# Patient Record
Sex: FEMALE | Race: ASIAN | NOT HISPANIC OR LATINO | Employment: UNEMPLOYED | URBAN - METROPOLITAN AREA
[De-identification: names, ages, dates, MRNs, and addresses within clinical notes are randomized per-mention and may not be internally consistent; named-entity substitution may affect disease eponyms.]

---

## 2023-09-29 ENCOUNTER — LAB (OUTPATIENT)
Dept: LAB | Facility: LAB | Age: 33
End: 2023-09-29
Payer: COMMERCIAL

## 2023-09-29 LAB
ABO GROUP (TYPE) IN BLOOD: NORMAL
ANTIBODY SCREEN: NORMAL
ERYTHROCYTE DISTRIBUTION WIDTH (RATIO) BY AUTOMATED COUNT: 12.6 % (ref 11.5–14.5)
ERYTHROCYTE MEAN CORPUSCULAR HEMOGLOBIN CONCENTRATION (G/DL) BY AUTOMATED: 33.4 G/DL (ref 32–36)
ERYTHROCYTE MEAN CORPUSCULAR VOLUME (FL) BY AUTOMATED COUNT: 96 FL (ref 80–100)
ERYTHROCYTES (10*6/UL) IN BLOOD BY AUTOMATED COUNT: 3.28 X10E12/L (ref 4–5.2)
FERRITIN, PREGNANCY: 90 UG/L
FOLATE, SERUM, PREGNANCY: 6.9 NG/ML
HEMATOCRIT (%) IN BLOOD BY AUTOMATED COUNT: 31.4 % (ref 36–46)
HEMOGLOBIN (G/DL) IN BLOOD: 10.5 G/DL (ref 12–16)
HEPATITIS B VIRUS SURFACE AG PRESENCE IN SERUM: NONREACTIVE
HEPATITIS C VIRUS AB PRESENCE IN SERUM: NONREACTIVE
HIV 1/ 2 AG/AB SCREEN: NONREACTIVE
IRON (UG/DL) IN SER/PLAS IN PREGNANCY: 40 UG/DL
IRON BINDING CAPACITY (UG/DL) IN PREGNANCY: 474 UG/DL
IRON SATURATION (%) IN PREGNANCY: 8 %
LEUKOCYTES (10*3/UL) IN BLOOD BY AUTOMATED COUNT: 8.8 X10E9/L (ref 4.4–11.3)
NRBC (PER 100 WBCS) BY AUTOMATED COUNT: 0 /100 WBC (ref 0–0)
PLATELETS (10*3/UL) IN BLOOD AUTOMATED COUNT: 227 X10E9/L (ref 150–450)
REFLEX ADDED, ANEMIA PANEL: ABNORMAL
RH FACTOR: NORMAL
RUBELLA VIRUS IGG AB: POSITIVE
SYPHILIS TOTAL AB: NONREACTIVE
VITAMIN B12, PREGNANCY: 290 PG/ML

## 2023-09-29 ASSESSMENT — EDINBURGH POSTNATAL DEPRESSION SCALE (EPDS)
I HAVE BLAMED MYSELF UNNECESSARILY WHEN THINGS WENT WRONG: NO, NEVER
THE THOUGHT OF HARMING MYSELF HAS OCCURRED TO ME: NEVER
I HAVE BEEN SO UNHAPPY THAT I HAVE BEEN CRYING: NO, NEVER
I HAVE BEEN ANXIOUS OR WORRIED FOR NO GOOD REASON: NO, NOT AT ALL
I HAVE BEEN ABLE TO LAUGH AND SEE THE FUNNY SIDE OF THINGS: NOT AT ALL
THINGS HAVE BEEN GETTING ON TOP OF ME: NO, I HAVE BEEN COPING AS WELL AS EVER
I HAVE BEEN SO UNHAPPY THAT I HAVE HAD DIFFICULTY SLEEPING: NOT AT ALL
I HAVE FELT SCARED OR PANICKY FOR NO GOOD REASON: NO, NOT AT ALL
I HAVE FELT SAD OR MISERABLE: NO, NOT AT ALL
I HAVE LOOKED FORWARD WITH ENJOYMENT TO THINGS: HARDLY AT ALL
TOTAL SCORE: 6

## 2023-09-30 LAB
CHLAMYDIA TRACH., AMPLIFIED: NEGATIVE
N. GONORRHEA, AMPLIFIED: NEGATIVE
TRICHOMONAS VAGINALIS: NEGATIVE

## 2023-10-01 LAB — URINE CULTURE: NORMAL

## 2023-10-03 LAB
HEMOGLOBIN A2: 3.1 %
HEMOGLOBIN A: 96.5 %
HEMOGLOBIN F: 0.4 %
HEMOGLOBIN IDENTIFICATION INTERPRETATION: NORMAL
PATH REVIEW-HGB IDENTIFICATION: NORMAL

## 2023-10-23 PROBLEM — H52.7 REFRACTION ERROR: Status: ACTIVE | Noted: 2023-10-23

## 2023-10-23 PROBLEM — N83.209: Status: ACTIVE | Noted: 2023-10-23

## 2023-10-23 PROBLEM — Z97.5 NEXPLANON IN PLACE: Status: ACTIVE | Noted: 2023-10-23

## 2023-10-23 PROBLEM — O09.92 ENCOUNTER FOR SUPERVISION OF HIGH RISK PREGNANCY IN SECOND TRIMESTER, ANTEPARTUM (HHS-HCC): Status: ACTIVE | Noted: 2023-10-23

## 2023-10-23 PROBLEM — R82.998: Status: ACTIVE | Noted: 2023-10-23

## 2023-10-23 PROBLEM — G43.009 COMMON MIGRAINE WITHOUT AURA: Status: ACTIVE | Noted: 2023-10-23

## 2023-10-23 PROBLEM — Z22.7 LATENT TUBERCULOSIS: Status: ACTIVE | Noted: 2023-10-23

## 2023-10-23 PROBLEM — O34.82: Status: ACTIVE | Noted: 2023-10-23

## 2023-10-23 PROBLEM — R42 DIZZINESS: Status: ACTIVE | Noted: 2023-10-23

## 2023-10-23 PROBLEM — R31.9 HEMATURIA: Status: ACTIVE | Noted: 2023-10-23

## 2023-10-23 RX ORDER — CIPROFLOXACIN 500 MG/1
500 TABLET ORAL 2 TIMES DAILY
COMMUNITY
Start: 2023-07-03 | End: 2023-10-25

## 2023-10-23 RX ORDER — METOCLOPRAMIDE 10 MG/1
1 TABLET ORAL 4 TIMES DAILY
COMMUNITY
Start: 2018-01-12 | End: 2024-01-05 | Stop reason: WASHOUT

## 2023-10-23 RX ORDER — DICYCLOMINE HYDROCHLORIDE 20 MG/1
1 TABLET ORAL EVERY 6 HOURS
COMMUNITY
Start: 2018-11-10 | End: 2024-01-05 | Stop reason: WASHOUT

## 2023-10-23 RX ORDER — DIPHENHYDRAMINE HCL 50 MG
50 CAPSULE ORAL EVERY 4 HOURS PRN
COMMUNITY
Start: 2018-01-12 | End: 2024-01-05 | Stop reason: WASHOUT

## 2023-10-23 RX ORDER — DOCUSATE SODIUM 100 MG/1
100 CAPSULE, LIQUID FILLED ORAL 2 TIMES DAILY
COMMUNITY
Start: 2016-06-18 | End: 2024-01-05 | Stop reason: WASHOUT

## 2023-10-23 RX ORDER — AMOXICILLIN 250 MG
CAPSULE ORAL
COMMUNITY
Start: 2018-12-20 | End: 2024-01-05 | Stop reason: WASHOUT

## 2023-10-23 RX ORDER — POLYETHYLENE GLYCOL 3350 17 G/17G
17 POWDER, FOR SOLUTION ORAL DAILY
COMMUNITY
Start: 2018-12-20 | End: 2024-01-05 | Stop reason: WASHOUT

## 2023-10-23 RX ORDER — NORTRIPTYLINE HYDROCHLORIDE 25 MG/1
25 CAPSULE ORAL NIGHTLY
COMMUNITY
Start: 2015-09-08 | End: 2024-01-05 | Stop reason: WASHOUT

## 2023-10-23 RX ORDER — ACETAMINOPHEN 500 MG
1 TABLET ORAL EVERY 6 HOURS PRN
COMMUNITY
Start: 2022-12-28 | End: 2024-01-05 | Stop reason: WASHOUT

## 2023-10-23 RX ORDER — FAMOTIDINE 20 MG/1
20 TABLET, FILM COATED ORAL 2 TIMES DAILY
COMMUNITY
Start: 2020-05-21 | End: 2024-01-05 | Stop reason: WASHOUT

## 2023-10-23 RX ORDER — IBUPROFEN 800 MG/1
TABLET ORAL
COMMUNITY
Start: 2018-02-08 | End: 2023-10-25

## 2023-10-23 RX ORDER — ETONOGESTREL 68 MG/1
IMPLANT SUBCUTANEOUS
COMMUNITY
End: 2024-01-05 | Stop reason: WASHOUT

## 2023-10-23 RX ORDER — OXYCODONE AND ACETAMINOPHEN 5; 325 MG/1; MG/1
1 TABLET ORAL
COMMUNITY
Start: 2018-12-07 | End: 2024-01-05 | Stop reason: WASHOUT

## 2023-10-25 ENCOUNTER — APPOINTMENT (OUTPATIENT)
Dept: RADIOLOGY | Facility: HOSPITAL | Age: 33
End: 2023-10-25

## 2023-10-25 ENCOUNTER — ROUTINE PRENATAL (OUTPATIENT)
Dept: OBSTETRICS AND GYNECOLOGY | Facility: HOSPITAL | Age: 33
End: 2023-10-25
Payer: COMMERCIAL

## 2023-10-25 ENCOUNTER — HOSPITAL ENCOUNTER (OUTPATIENT)
Dept: RADIOLOGY | Facility: HOSPITAL | Age: 33
Discharge: HOME | End: 2023-10-25
Payer: COMMERCIAL

## 2023-10-25 VITALS — WEIGHT: 145 LBS | BODY MASS INDEX: 28.32 KG/M2 | SYSTOLIC BLOOD PRESSURE: 119 MMHG | DIASTOLIC BLOOD PRESSURE: 79 MMHG

## 2023-10-25 DIAGNOSIS — Z36.89 SCREENING, ANTENATAL, FOR FETAL ANATOMIC SURVEY (HHS-HCC): ICD-10-CM

## 2023-10-25 DIAGNOSIS — Z3A.20 20 WEEKS GESTATION OF PREGNANCY (HHS-HCC): Primary | ICD-10-CM

## 2023-10-25 PROCEDURE — 99213 OFFICE O/P EST LOW 20 MIN: CPT | Performed by: NURSE PRACTITIONER

## 2023-10-25 PROCEDURE — 76811 OB US DETAILED SNGL FETUS: CPT | Performed by: OBSTETRICS & GYNECOLOGY

## 2023-10-25 PROCEDURE — 76811 OB US DETAILED SNGL FETUS: CPT

## 2023-10-25 PROCEDURE — 99213 OFFICE O/P EST LOW 20 MIN: CPT | Mod: TH | Performed by: NURSE PRACTITIONER

## 2023-10-25 NOTE — PROGRESS NOTES
Pt here with sister.  Gama device used for interpretation.  Patient denies problems.  Does have ultrasound scheduled for today.

## 2023-11-22 ENCOUNTER — APPOINTMENT (OUTPATIENT)
Dept: OBSTETRICS AND GYNECOLOGY | Facility: HOSPITAL | Age: 33
End: 2023-11-22
Payer: COMMERCIAL

## 2023-11-27 ENCOUNTER — HOSPITAL ENCOUNTER (OUTPATIENT)
Dept: RADIOLOGY | Facility: HOSPITAL | Age: 33
Discharge: HOME | End: 2023-11-27
Payer: COMMERCIAL

## 2023-11-27 ENCOUNTER — ROUTINE PRENATAL (OUTPATIENT)
Dept: OBSTETRICS AND GYNECOLOGY | Facility: HOSPITAL | Age: 33
End: 2023-11-27
Payer: COMMERCIAL

## 2023-11-27 ENCOUNTER — LAB (OUTPATIENT)
Dept: LAB | Facility: LAB | Age: 33
End: 2023-11-27
Payer: COMMERCIAL

## 2023-11-27 VITALS — WEIGHT: 155 LBS | DIASTOLIC BLOOD PRESSURE: 80 MMHG | SYSTOLIC BLOOD PRESSURE: 110 MMHG | BODY MASS INDEX: 30.27 KG/M2

## 2023-11-27 DIAGNOSIS — Z36.89 SCREENING, ANTENATAL, FOR FETAL ANATOMIC SURVEY (HHS-HCC): ICD-10-CM

## 2023-11-27 DIAGNOSIS — Z3A.24 24 WEEKS GESTATION OF PREGNANCY (HHS-HCC): Primary | ICD-10-CM

## 2023-11-27 DIAGNOSIS — Z3A.24 24 WEEKS GESTATION OF PREGNANCY (HHS-HCC): ICD-10-CM

## 2023-11-27 LAB
ERYTHROCYTE [DISTWIDTH] IN BLOOD BY AUTOMATED COUNT: 13.3 % (ref 11.5–14.5)
GLUCOSE 1H P 50 G GLC PO SERPL-MCNC: 139 MG/DL
HCT VFR BLD AUTO: 30.4 % (ref 36–46)
HGB BLD-MCNC: 9.9 G/DL (ref 12–16)
MCH RBC QN AUTO: 31.2 PG (ref 26–34)
MCHC RBC AUTO-ENTMCNC: 32.6 G/DL (ref 32–36)
MCV RBC AUTO: 96 FL (ref 80–100)
NRBC BLD-RTO: 0 /100 WBCS (ref 0–0)
PLATELET # BLD AUTO: 241 X10*3/UL (ref 150–450)
RBC # BLD AUTO: 3.17 X10*6/UL (ref 4–5.2)
T PALLIDUM AB SER QL: NONREACTIVE
WBC # BLD AUTO: 6.9 X10*3/UL (ref 4.4–11.3)

## 2023-11-27 PROCEDURE — 82728 ASSAY OF FERRITIN: CPT

## 2023-11-27 PROCEDURE — 99213 OFFICE O/P EST LOW 20 MIN: CPT | Mod: TH,25

## 2023-11-27 PROCEDURE — 99213 OFFICE O/P EST LOW 20 MIN: CPT

## 2023-11-27 PROCEDURE — 85027 COMPLETE CBC AUTOMATED: CPT

## 2023-11-27 PROCEDURE — 82607 VITAMIN B-12: CPT

## 2023-11-27 PROCEDURE — 76816 OB US FOLLOW-UP PER FETUS: CPT

## 2023-11-27 PROCEDURE — 76816 OB US FOLLOW-UP PER FETUS: CPT | Performed by: OBSTETRICS & GYNECOLOGY

## 2023-11-27 PROCEDURE — 36415 COLL VENOUS BLD VENIPUNCTURE: CPT

## 2023-11-27 PROCEDURE — 76819 FETAL BIOPHYS PROFIL W/O NST: CPT | Performed by: OBSTETRICS & GYNECOLOGY

## 2023-11-27 PROCEDURE — 82746 ASSAY OF FOLIC ACID SERUM: CPT

## 2023-11-27 PROCEDURE — 83550 IRON BINDING TEST: CPT

## 2023-11-27 PROCEDURE — 82947 ASSAY GLUCOSE BLOOD QUANT: CPT

## 2023-11-27 PROCEDURE — 86780 TREPONEMA PALLIDUM: CPT

## 2023-11-27 NOTE — PROGRESS NOTES
Assessment/Plan   Problem List Items Addressed This Visit    None  Visit Diagnoses         Codes    24 weeks gestation of pregnancy    -  Primary Z3A.24    Relevant Medications    prenatal vitamin, iron-folic, 27 mg iron-800 mcg folic acid tablet    Other Relevant Orders    Glucose, 1 Hour Screen, Pregnancy    CBC Anemia Panel With Reflex, Pregnancy    Syphilis Screen with Reflex          Discussed diabetes screening and routine labs, to be completed today  Patient does not have prenatals; Rx sent  Anatomy scan completed today  No other concerns today  Reviewed s/sx of PTL, warning signs, fetal movement counts, and when to call provider  Follow up in 4 weeks for a routine prenatal visit.    Mitali Chappell, ALICIA-TOBI    Subjective     Reny Mendoza is a 33 y.o.  at 24w6d with a working estimated date of delivery of 3/12/2024, by Last Menstrual Period who presents for a routine prenatal visit. She denies vaginal bleeding, leakage of fluid, decreased fetal movements, or contractions.    Her pregnancy is complicated by:  Pregnancy Problems (from 23 to present)       No problems associated with this episode.             Objective   Physical Exam  Weight: 70.3 kg (155 lb)  TWG: Not found.  Expected Total Weight Gain: Could not be calculated   Pregravid BMI: Could not be calculated  BP: 110/80  Fetal Heart Rate: 140 Fundal Height (cm): 25 cm    Postpartum Depression: Medium Risk (2023)    [place]  Depression Scale     Last EPDS Total Score: 6     Last EPDS Self Harm Result: Never       Prenatal Labs  Lab Results   Component Value Date    HGB 10.5 (L) 2023    HCT 31.4 (L) 2023     2023    ABO A 2023    LABRH POS 2023    NEISSGONOAMP NEGATIVE 2023    CHLAMTRACAMP NEGATIVE 2023    SYPHT NONREACTIVE 2023    HEPBSAG NONREACTIVE 2023    HIV1X2 NONREACTIVE 2023    URINECULTURE NO SIGNIFICANT GROWTH. 2023

## 2023-11-28 LAB
FERRITIN SERPL-MCNC: 20 NG/ML
FOLATE SERPL-MCNC: 4 NG/ML
IRON SATN MFR SERPL: NORMAL %
IRON SERPL-MCNC: 57 UG/DL
TIBC SERPL-MCNC: NORMAL UG/DL
UIBC SERPL-MCNC: >450 UG/DL
VIT B12 SERPL-MCNC: 185 PG/ML

## 2023-11-29 DIAGNOSIS — R73.09 GLUCOSE TOLERANCE TEST ABNORMAL: ICD-10-CM

## 2023-11-29 PROBLEM — O99.012 ANEMIA DURING PREGNANCY IN SECOND TRIMESTER (HHS-HCC): Status: ACTIVE | Noted: 2023-11-29

## 2023-11-29 RX ORDER — FERROUS SULFATE 325(65) MG
325 TABLET ORAL
Qty: 30 TABLET | Refills: 11 | Status: SHIPPED | OUTPATIENT
Start: 2023-11-29 | End: 2024-02-21 | Stop reason: HOSPADM

## 2023-11-30 ENCOUNTER — TELEPHONE (OUTPATIENT)
Dept: OBSTETRICS AND GYNECOLOGY | Facility: HOSPITAL | Age: 33
End: 2023-11-30
Payer: COMMERCIAL

## 2023-11-30 NOTE — TELEPHONE ENCOUNTER
----- Message from BLAISE Bah sent at 11/29/2023  3:48 PM EST -----  Regarding: elevated 1hr results  I sent a secure chat to the MD seeing her next because the patient speaks Jaki, but the MD is still requesting that we try to reach out to her sooner to try to explain the elevated 1hr results and her needing a 3hr.    Olivia is seeing her on 12/26.    Thanks!    BLAISE Bah

## 2023-12-04 NOTE — TELEPHONE ENCOUNTER
Several attempts have been made to contact the patient to give results for fail 1 hour glucose   RN called patient at the number listed in her chart on 11/30, 12/1 and on 12/4  A letter was sent on 12/4 encouraging patient to call and reschedule.   This cert letter sent on the importance of the return call to the office  A letter was sent via certified mail to the address on file. [phone]  No further attempts will be made to contact patient, provider notified via task  PHOEBE Cruz-INDY     ----- Message from BLAISE Bah sent at 11/29/2023  3:48 PM EST -----  Regarding: elevated 1hr results  I sent a secure chat to the MD seeing her next because the patient speaks Jaki, but the MD is still requesting that we try to reach out to her sooner to try to explain the elevated 1hr results and her needing a 3hr.    Olivia is seeing her on 12/26.    Thanks!    BLAISE Bah

## 2023-12-04 NOTE — TELEPHONE ENCOUNTER
----- Message from BLAIES Bah sent at 11/29/2023  3:48 PM EST -----  Regarding: elevated 1hr results  I sent a secure chat to the MD seeing her next because the patient speaks Jaki, but the MD is still requesting that we try to reach out to her sooner to try to explain the elevated 1hr results and her needing a 3hr.    Olivia is seeing her on 12/26.    Thanks!    BLAISE Bah

## 2023-12-15 ENCOUNTER — DOCUMENTATION (OUTPATIENT)
Dept: OBSTETRICS AND GYNECOLOGY | Facility: HOSPITAL | Age: 33
End: 2023-12-15
Payer: COMMERCIAL

## 2023-12-15 NOTE — PROGRESS NOTES
RN received returned certified letter return to sender undeliverable no forwarding address  [phone]   PHOEBE Cruz-RN

## 2023-12-26 ENCOUNTER — APPOINTMENT (OUTPATIENT)
Dept: OBSTETRICS AND GYNECOLOGY | Facility: HOSPITAL | Age: 33
End: 2023-12-26
Payer: COMMERCIAL

## 2024-01-02 PROBLEM — R31.9 HEMATURIA: Status: RESOLVED | Noted: 2023-10-23 | Resolved: 2024-01-02

## 2024-01-02 PROBLEM — Z97.5 NEXPLANON IN PLACE: Status: RESOLVED | Noted: 2023-10-23 | Resolved: 2024-01-02

## 2024-01-02 PROBLEM — O09.523 MULTIGRAVIDA OF ADVANCED MATERNAL AGE IN THIRD TRIMESTER (HHS-HCC): Status: ACTIVE | Noted: 2024-01-02

## 2024-01-02 PROBLEM — R82.998: Status: RESOLVED | Noted: 2023-10-23 | Resolved: 2024-01-02

## 2024-01-02 NOTE — PROGRESS NOTES
Subjective     Reny Mendoza is a 34 y.o.  at 32w6d with a working estimated date of delivery of 3/12/2024, by 22w US who presents for a routine prenatal visit.     She denies vaginal bleeding, leakage of fluid, decreased fetal movements, or contractions.    Patient unsure of her medications. She thinks she's taking PNVs and not iron.     Patient unaware of need to go 3hr GTT.     [employer]  #[id number] used.     Objective   Physical Exam  Weight: 73.5 kg (162 lb)  Expected Total Weight Gain: Could not be calculated   Pregravid BMI: Could not be calculated  BP: 113/73  Fetal Heart Rate: 130 Fundal Height (cm): 32 cm      Problem List Items Addressed This Visit       Supervision of other normal pregnancy, antepartum    Overview     Desired provider in labor: [] CNM  [] Physician  [x] Dated by:  22w US not c/w LMP   [] Initial BMI:   [x] Prenatal Labs: WNL  [x] Rh status: A+  [] Genetic Screening:    [x] Baby ASA: not on    [x] Anatomy US: WNl  [x] Fetal Sex: male, declines circ  [] Patient added to BirthTracks  [x] 1hr GCT at 24-28wks: 23 139  [] 3 hr GTT (if indicated):  [] Fetal Surveillance (if indicated):    [x] Tdap (27-36wks):  [x] Flu Shot:   [] COVID vaccine:     [] Breastfeeding:  [] Pain management during labor:   [] Postpartum Birth control method:   [] Labor Support:   [] GBS at 36 wks:         Anemia during pregnancy in second trimester    Overview     Lab Results   Component Value Date    WBC 6.9 2023    HGB 9.9 (L) 2023    HCT 30.4 (L) 2023    MCV 96 2023     2023   Folate 4  Ferritin 20  Patient unsure if she's taking that at 32w; didn't bring medications with her; rx resent   Recheck CBC next visit           Relevant Medications    ferrous gluconate (Fergon) 240 (27 Fe) MG tablet    Glucose tolerance test abnormal - Primary    Overview     Failed 1hr  3hr ordered ; discussed with patient at 32w visit         Multigravida of advanced maternal  age in third trimester    Overview     Not seen by genetics         Hx of  section    Overview     2016 LTCS for breech  Unsure if she would like to  or not          History of postpartum hemorrhage    Overview     With 2016 pLTCS  S/p blood transfusion   Type and cross on admission           Other Visit Diagnoses       32 weeks gestation of pregnancy                Discussed Tdap vaccine, VIS handout provided, patient accepted  Discussed flu vaccine, VIS handout provided, patient accepted  Iron rx resent  Encouraged patient to bring all medications next visit so we can make sure she knows how to take them  -Reviewed reasons to call CNM on-call: decreased fetal movement, vaginal bleeding, loss of fluid, increased pelvic pain/contractions, or any questions/concerns  -RTC in 2 weeks or prn  *next visit: 3hr GTT, CBC, medications?, MFMU score    ALICIA Neville-TOBI

## 2024-01-05 ENCOUNTER — ROUTINE PRENATAL (OUTPATIENT)
Dept: OBSTETRICS AND GYNECOLOGY | Facility: HOSPITAL | Age: 34
End: 2024-01-05
Payer: COMMERCIAL

## 2024-01-05 VITALS — SYSTOLIC BLOOD PRESSURE: 113 MMHG | WEIGHT: 162 LBS | BODY MASS INDEX: 31.64 KG/M2 | DIASTOLIC BLOOD PRESSURE: 73 MMHG

## 2024-01-05 DIAGNOSIS — O99.012 ANEMIA DURING PREGNANCY IN SECOND TRIMESTER (HHS-HCC): ICD-10-CM

## 2024-01-05 DIAGNOSIS — R73.09 GLUCOSE TOLERANCE TEST ABNORMAL: ICD-10-CM

## 2024-01-05 DIAGNOSIS — Z87.59 HISTORY OF POSTPARTUM HEMORRHAGE: ICD-10-CM

## 2024-01-05 DIAGNOSIS — O09.523 MULTIGRAVIDA OF ADVANCED MATERNAL AGE IN THIRD TRIMESTER (HHS-HCC): ICD-10-CM

## 2024-01-05 DIAGNOSIS — Z98.891 HX OF CESAREAN SECTION: ICD-10-CM

## 2024-01-05 DIAGNOSIS — Z34.80 SUPERVISION OF OTHER NORMAL PREGNANCY, ANTEPARTUM (HHS-HCC): ICD-10-CM

## 2024-01-05 DIAGNOSIS — Z3A.32 32 WEEKS GESTATION OF PREGNANCY (HHS-HCC): Primary | ICD-10-CM

## 2024-01-05 PROCEDURE — 99213 OFFICE O/P EST LOW 20 MIN: CPT | Performed by: ADVANCED PRACTICE MIDWIFE

## 2024-01-05 PROCEDURE — 99213 OFFICE O/P EST LOW 20 MIN: CPT | Mod: TH | Performed by: ADVANCED PRACTICE MIDWIFE

## 2024-01-05 PROCEDURE — 90686 IIV4 VACC NO PRSV 0.5 ML IM: CPT | Performed by: ADVANCED PRACTICE MIDWIFE

## 2024-01-05 PROCEDURE — 90715 TDAP VACCINE 7 YRS/> IM: CPT | Performed by: ADVANCED PRACTICE MIDWIFE

## 2024-01-05 RX ORDER — QUINIDINE GLUCONATE 324 MG
480 TABLET, EXTENDED RELEASE ORAL EVERY OTHER DAY
Qty: 60 TABLET | Refills: 3 | Status: SHIPPED | OUTPATIENT
Start: 2024-01-05 | End: 2024-02-12 | Stop reason: SDUPTHER

## 2024-01-05 NOTE — LETTER
2024     Patient: Reny Mendoza   YOB: 1990   Date of Visit: 2024       To Whom it May Concern,    This is to certify that Reny Mendoza is my patient. Her due take is 24. Please allow her , Charisse Mendoza,  1985 time off after she gives birth to help care for her and their infant during her postpartum course.     If you have any questions or concerns please let me know.     Sincerely,             ALICIA Neville-CNM      ______________________________________________________________________________________

## 2024-01-19 ENCOUNTER — APPOINTMENT (OUTPATIENT)
Dept: OBSTETRICS AND GYNECOLOGY | Facility: HOSPITAL | Age: 34
End: 2024-01-19
Payer: COMMERCIAL

## 2024-01-19 ENCOUNTER — LAB (OUTPATIENT)
Dept: LAB | Facility: LAB | Age: 34
End: 2024-01-19
Payer: COMMERCIAL

## 2024-01-19 ENCOUNTER — ROUTINE PRENATAL (OUTPATIENT)
Dept: OBSTETRICS AND GYNECOLOGY | Facility: HOSPITAL | Age: 34
End: 2024-01-19
Payer: COMMERCIAL

## 2024-01-19 VITALS — SYSTOLIC BLOOD PRESSURE: 109 MMHG | BODY MASS INDEX: 31.44 KG/M2 | WEIGHT: 161 LBS | DIASTOLIC BLOOD PRESSURE: 74 MMHG

## 2024-01-19 DIAGNOSIS — Z3A.34 34 WEEKS GESTATION OF PREGNANCY (HHS-HCC): ICD-10-CM

## 2024-01-19 DIAGNOSIS — O99.013 ANEMIA DURING PREGNANCY IN THIRD TRIMESTER (HHS-HCC): ICD-10-CM

## 2024-01-19 DIAGNOSIS — R73.09 GLUCOSE TOLERANCE TEST ABNORMAL: ICD-10-CM

## 2024-01-19 DIAGNOSIS — O99.013 ANEMIA DURING PREGNANCY IN THIRD TRIMESTER (HHS-HCC): Primary | ICD-10-CM

## 2024-01-19 LAB
ERYTHROCYTE [DISTWIDTH] IN BLOOD BY AUTOMATED COUNT: 14.1 % (ref 11.5–14.5)
GLUCOSE 1H P 100 G GLC PO SERPL-MCNC: 203 MG/DL
GLUCOSE 2H P 100 G GLC PO SERPL-MCNC: 116 MG/DL
GLUCOSE 3H P 100 G GLC PO SERPL-MCNC: 152 MG/DL
GLUCOSE P FAST SERPL-MCNC: 81 MG/DL
HCT VFR BLD AUTO: 35.7 % (ref 36–46)
HGB BLD-MCNC: 11.6 G/DL (ref 12–16)
MCH RBC QN AUTO: 29.9 PG (ref 26–34)
MCHC RBC AUTO-ENTMCNC: 32.5 G/DL (ref 32–36)
MCV RBC AUTO: 92 FL (ref 80–100)
NRBC BLD-RTO: 0 /100 WBCS (ref 0–0)
PLATELET # BLD AUTO: 250 X10*3/UL (ref 150–450)
RBC # BLD AUTO: 3.88 X10*6/UL (ref 4–5.2)
WBC # BLD AUTO: 8.6 X10*3/UL (ref 4.4–11.3)

## 2024-01-19 PROCEDURE — 82952 GTT-ADDED SAMPLES: CPT

## 2024-01-19 PROCEDURE — 36415 COLL VENOUS BLD VENIPUNCTURE: CPT

## 2024-01-19 PROCEDURE — 85027 COMPLETE CBC AUTOMATED: CPT

## 2024-01-19 PROCEDURE — 99213 OFFICE O/P EST LOW 20 MIN: CPT | Performed by: OBSTETRICS & GYNECOLOGY

## 2024-01-19 PROCEDURE — 82950 GLUCOSE TEST: CPT

## 2024-01-19 PROCEDURE — 99213 OFFICE O/P EST LOW 20 MIN: CPT | Mod: TH | Performed by: OBSTETRICS & GYNECOLOGY

## 2024-01-19 PROCEDURE — 82951 GLUCOSE TOLERANCE TEST (GTT): CPT

## 2024-01-19 PROCEDURE — 82947 ASSAY GLUCOSE BLOOD QUANT: CPT

## 2024-01-19 NOTE — PROGRESS NOTES
"Subjective   Patient ID [id number]   Reny Mendoza is a 34 y.o.  at 34w6d with a working estimated date of delivery of 2024, by Ultrasound who presents for a routine prenatal visit. She denies vaginal bleeding, leakage of fluid, decreased fetal movements, or contractions.    1NSVD, 2nd baby CS, would like TOLAC   2nd baby was breech    Her pregnancy is complicated by:  Anemia    Objective   Physical Exam:  General: Alert and oriented. Appears well-nourished and in no acute distress.  Respiratory/Thorax: normal respiratory effort  Abdomen: normal gravid uterus, Crown-rump 33cm  FHR: 145bpm  Psychological: Appropriate mood and affect.   Skin: No visible rashes or lesions.    Weight: 73 kg (161 lb)  Expected Total Weight Gain: Could not be calculated   Pregravid BMI: Could not be calculated  BP: 109/74                Prenatal Labs  Urine Dip:  Lab Results   Component Value Date    KETONESU NEGATIVE 2018     Lab Results   Component Value Date    HGB 9.9 (L) 2023    HCT 30.4 (L) 2023    ABO A 2023    HEPBSAG NONREACTIVE 2023     No results found for: \"PAPPA\", \"AFP\", \"HCG\", \"ESTRIOL\", \"INHBA\"  No results found for: \"GLUF\", \"GLUT1\", \"YWSMYCJ9CI\", \"GCPAYNT3ZJ\"    Imaging  The most recent ultrasound was performed on 10/25/23 with study GA 22w 4d and EFW of 525 g.           Assessment/Plan   Problem List Items Addressed This Visit    None  Visit Diagnoses         Codes    Anemia during pregnancy in third trimester    -  Primary O99.013    Relevant Orders    CBC Anemia Panel With Reflex, Pregnancy    34 weeks gestation of pregnancy     Z3A.34        Have CBC drawn d/t hx of anemia, last Hgb 9.9, patient takes iron supplement  Continue prenatal vitamin.  Labs reviewed.  GBS taken.  Expected mode of delivery, TOLAC  Follow up in 2 week for a routine prenatal visit.    Patient was seen and discussed with attending physician Dr. Hammond.    Bess Trammell, DO  Family Medicine, PGY-2    "

## 2024-01-19 NOTE — PROGRESS NOTES
I saw and evaluated the patient. I personally obtained the key and critical portions of the history and physical exam or was physically present for key and critical portions performed by the resident/fellow. I reviewed the resident/fellow's documentation and discussed the patient with the resident/fellow. I agree with the resident/fellow's medical decision making as documented in the note.    Юлия Hammond MD

## 2024-01-22 DIAGNOSIS — O24.419 GESTATIONAL DIABETES MELLITUS (GDM) IN THIRD TRIMESTER, GESTATIONAL DIABETES METHOD OF CONTROL UNSPECIFIED (HHS-HCC): Primary | ICD-10-CM

## 2024-01-22 RX ORDER — LANCETS
EACH MISCELLANEOUS
Qty: 100 EACH | Refills: 3 | Status: SHIPPED | OUTPATIENT
Start: 2024-01-22 | End: 2024-02-21 | Stop reason: HOSPADM

## 2024-01-22 RX ORDER — ISOPROPYL ALCOHOL 70 ML/100ML
1 SWAB TOPICAL 4 TIMES DAILY
Qty: 100 EACH | Refills: 3 | Status: SHIPPED | OUTPATIENT
Start: 2024-01-22 | End: 2024-02-21 | Stop reason: HOSPADM

## 2024-01-25 ENCOUNTER — TELEPHONE (OUTPATIENT)
Dept: OBSTETRICS AND GYNECOLOGY | Facility: HOSPITAL | Age: 34
End: 2024-01-25
Payer: COMMERCIAL

## 2024-01-25 ENCOUNTER — TELEPHONE (OUTPATIENT)
Dept: OBSTETRICS AND GYNECOLOGY | Facility: HOSPITAL | Age: 34
End: 2024-01-25

## 2024-01-25 NOTE — TELEPHONE ENCOUNTER
RN received a returned call from patient Sister   Identified patient by name and   Patient sister informed patient has been diagnosed with GDM and the medication has been sent to the pharmacy.  Patient also informed she need to come in for diabetic teaching she is unable to make it until her appointment.    Patient informed she can speak to the pharmacist when she picks up the medication so she can begin taking the medication.  Patient sister verbalized understanding all questions and concerns addressed  PHOEBE Cruz-INDY        ----- Message from BLAISE Bah sent at 2024  4:14 PM EST -----  Regarding: GDM  This patient is a new gestational diabetic.  I ordered everything for her to  from the pharmacy ASAP and a referral to [facility] placed if you could get her enrolled in Diabetic Boot Camp and send her the sheet to track her blood glucoses.    I believe she has a primary other language, but I can't remember what it is right now...    Thanks!    BLAISE Bah

## 2024-01-30 ENCOUNTER — PHARMACY VISIT (OUTPATIENT)
Dept: PHARMACY | Facility: CLINIC | Age: 34
End: 2024-01-30
Payer: MEDICAID

## 2024-01-30 ENCOUNTER — INITIAL PRENATAL (OUTPATIENT)
Dept: MATERNAL FETAL MEDICINE | Facility: CLINIC | Age: 34
End: 2024-01-30
Payer: COMMERCIAL

## 2024-01-30 ENCOUNTER — HOSPITAL ENCOUNTER (OUTPATIENT)
Dept: RADIOLOGY | Facility: CLINIC | Age: 34
Discharge: HOME | End: 2024-01-30
Payer: COMMERCIAL

## 2024-01-30 VITALS
BODY MASS INDEX: 32.22 KG/M2 | HEART RATE: 106 BPM | DIASTOLIC BLOOD PRESSURE: 88 MMHG | SYSTOLIC BLOOD PRESSURE: 129 MMHG | WEIGHT: 165 LBS

## 2024-01-30 DIAGNOSIS — O24.419 GESTATIONAL DIABETES MELLITUS (GDM) IN THIRD TRIMESTER, GESTATIONAL DIABETES METHOD OF CONTROL UNSPECIFIED (HHS-HCC): ICD-10-CM

## 2024-01-30 DIAGNOSIS — Z34.80 SUPERVISION OF OTHER NORMAL PREGNANCY, ANTEPARTUM (HHS-HCC): Primary | ICD-10-CM

## 2024-01-30 DIAGNOSIS — O09.523 MULTIGRAVIDA OF ADVANCED MATERNAL AGE IN THIRD TRIMESTER (HHS-HCC): ICD-10-CM

## 2024-01-30 LAB
GLUCOSE BLD MANUAL STRIP-MCNC: 107 MG/DL (ref 74–99)
REFLEX ADDED, ANEMIA PANEL: NORMAL
REFLEX ADDED, ANEMIA PANEL: NORMAL

## 2024-01-30 PROCEDURE — 76819 FETAL BIOPHYS PROFIL W/O NST: CPT | Performed by: MEDICAL GENETICS

## 2024-01-30 PROCEDURE — 76819 FETAL BIOPHYS PROFIL W/O NST: CPT

## 2024-01-30 PROCEDURE — 82947 ASSAY GLUCOSE BLOOD QUANT: CPT | Performed by: OBSTETRICS & GYNECOLOGY

## 2024-01-30 PROCEDURE — 76816 OB US FOLLOW-UP PER FETUS: CPT | Performed by: MEDICAL GENETICS

## 2024-01-30 PROCEDURE — 99215 OFFICE O/P EST HI 40 MIN: CPT | Mod: GC | Performed by: OBSTETRICS & GYNECOLOGY

## 2024-01-30 PROCEDURE — RXMED WILLOW AMBULATORY MEDICATION CHARGE

## 2024-01-30 PROCEDURE — 99215 OFFICE O/P EST HI 40 MIN: CPT | Performed by: OBSTETRICS & GYNECOLOGY

## 2024-01-30 PROCEDURE — 76816 OB US FOLLOW-UP PER FETUS: CPT

## 2024-01-30 RX ORDER — BLOOD SUGAR DIAGNOSTIC
STRIP MISCELLANEOUS
Qty: 150 EACH | Refills: 3 | Status: SHIPPED | OUTPATIENT
Start: 2024-01-30 | End: 2024-02-21 | Stop reason: HOSPADM

## 2024-01-30 RX ORDER — BLOOD SUGAR DIAGNOSTIC
STRIP MISCELLANEOUS
Qty: 150 EACH | Refills: 3 | Status: SHIPPED | OUTPATIENT
Start: 2024-01-30 | End: 2024-01-30 | Stop reason: ENTERED-IN-ERROR

## 2024-01-30 RX ORDER — DEXTROSE 4 G
TABLET,CHEWABLE ORAL
Qty: 1 EACH | Refills: 0 | Status: SHIPPED | OUTPATIENT
Start: 2024-01-30 | End: 2024-02-21 | Stop reason: HOSPADM

## 2024-01-30 RX ORDER — INSULIN PUMP SYRINGE, 3 ML
EACH MISCELLANEOUS
Qty: 1 EACH | Refills: 0 | Status: SHIPPED | OUTPATIENT
Start: 2024-01-30 | End: 2024-01-30 | Stop reason: ENTERED-IN-ERROR

## 2024-01-30 RX ORDER — LANCETS
EACH MISCELLANEOUS
Qty: 200 EACH | Refills: 3 | Status: SHIPPED | OUTPATIENT
Start: 2024-01-30 | End: 2024-02-21 | Stop reason: HOSPADM

## 2024-01-30 RX ORDER — LANCETS 33 GAUGE
EACH MISCELLANEOUS
Qty: 200 EACH | Refills: 3 | Status: SHIPPED | OUTPATIENT
Start: 2024-01-30 | End: 2024-02-21 | Stop reason: HOSPADM

## 2024-01-30 ASSESSMENT — PAIN SCALES - GENERAL: PAINLEVEL_OUTOF10: 0 - NO PAIN

## 2024-01-30 ASSESSMENT — PAIN - FUNCTIONAL ASSESSMENT: PAIN_FUNCTIONAL_ASSESSMENT: 0-10

## 2024-01-30 NOTE — LETTER
2024     BLAISE Bah  [address]  Department Of Ob/Gyn  [facility] [address]    Patient: Reny Mendoza   YOB: 1990   Date of Visit: 2024       Dear Dr. Mitali Chappell, BLAISE:    Thank you for referring Reny Mendoza to me for evaluation. Below are my notes for this consultation.  If you have questions, please do not hesitate to call me. I look forward to following your patient along with you.       Sincerely,     Stan Becerra MD      CC: No Recipients  ______________________________________________________________________________________    2024   Reny Mendoza     [facility] CONSULT NOTE  Referring Clinician: Martita VARGAS  Reason for consult: GDMA    HPI: Reny Mendoza is a 34 y.o.  at 36w3d here for consult for newly diagnosed GDM in the late 3rd trimester.     Patient reports Feeling well today Speaks Luxembourgish and  present on the [place]. Denies contractions, bleeding, or LOF. Reports normal fetal movement. Since getting her diagnosis of GDM outpatient has not had diabetes bootcamp and does not have supplies to check her BG. POCT 107 in the office today.    Pregnancy Notable For:  -AMA s/p cfDNA  -GDM, abnl 1hr and confirmed on 3h GTT        10 point review of system is negative except as above    OB History          3    Para   2    Term   2       0    AB   0    Living   2         SAB   0    IAB   0    Ectopic   0    Multiple   0    Live Births   2                   Past medical history: Denies HTN, DM, asthma, depression, or thyroid issues    Past Surgical History:   Procedure Laterality Date   •  SECTION, LOW TRANSVERSE     • OOPHORECTOMY         Medications:     Medication Documentation Review Audit       Reviewed by Lisa Garzon LPN (Licensed Nurse) on 24 at 1433      Medication Order Taking? Sig Documenting Provider Last Dose Status   alcohol swabs (Alcohol Prep Pads) pads, medicated  [id number]  Apply 1 Product topically 4 times a day. BLAISE Bah  Active   ferrous gluconate (Fergon) 240 (27 Fe) MG tablet [id number]  Take 2 tablets (480 mg) by mouth every other day. One pill twice a day every other day Padma ForbesBLAISE  Active   ferrous sulfate, 325 mg ferrous sulfate, tablet [id number]  Take 1 tablet by mouth once daily with a meal. BLAISE Bah  Active   lancets misc [id number]  Test blood sugar fasting in the morning, then 1hr after each meal (4 times per day) BLAISE Bah  Active   prenatal vitamin, iron-folic, 27 mg iron-800 mcg folic acid tablet [id number]  Take 1 tablet by mouth once daily. BLAISE Bah  Active                     No Known Allergies    Social History     Tobacco Use   • Smoking status: Never   • Smokeless tobacco: Current     Types: Chew   Vaping Use   • Vaping Use: Never used   Substance Use Topics   • Alcohol use: Never   • Drug use: Never       family history includes Brain cancer in her sister; Tuberculosis in her father.      OBJECTIVE  Visit Vitals  /88   Pulse 106   Wt 74.8 kg (165 lb)   LMP 2023   BMI 32.22 kg/m²   OB Status Pregnant   Smoking Status Never   BSA 1.78 m²       Physical exam  Physical Examination  General: no acute distress  HEENT: normocephalic, atraumatic  Heart: warm and well perfused  Lungs: breathing comfortably on room air  Abdomen: soft, gravid, nontender  Extremities: moving all extremities  Neuro: awake and conversant  Psych: appropriate mood and affect   FHT: US    ASSESSMENT & PLAN    Reny Mendoza is a 34 y.o.  at 36w3d here for the following:    Gestational diabetes mellitus (GDM) in third trimester  Patient was recently diagnosed with gestational diabetes (GDM).  We discussed the pregnancy implications of the diagnosis including increased risk of pre-eclampsia, LGA/macrosomia, shoulder dystocia, stillbirth as well as   hypoglycemia, hyperbilirubinemia and respiratory distress.  We reviewed the importance of glycemic control and its impact on lowering these risks.  Discussed management ranging from dietary changes to pharmacotherapy and that insulin is considered first line therapy rather than oral agents if medication is ultimately needed.  Discussed our recommendation for serial growth ultrasounds and starting  testing at 32 weeks if treatment is required.  We also stressed the potential persistence of impaired glucose tolerance post pregnancy in up to 30% of patients and 50% risk of IGT/T2DM in the next 10 years with an overall lifetime risk of T2DM of 70%. We reviewed the recommendation for postpartum screening at 6 weeks post-partum (can be performed as soon as 2 days after delivery) and, if normal, routine screening every 1-3 years. We did discuss that a healthy diet and maintenance of a normal body weight may reduce those risks  -Diabetes RN education   -BG supplies given, RTC 1 week for BG review and delivery planning  - US with normal growth and amniotic fluid.    Supervision of other normal pregnancy, antepartum  -PNL's reviewed, UTD.   -Encourage daily PNV use    Multigravida of advanced maternal age in third trimester  S/p cfDNA  Not seen by genetics       In summary the following is recommended:    1. We will continue to co-manage diabetes via the Bloodsugar line with weekly assessment of glycemic control.  Current regimen is: Diet  2. We will plan for a follow up MD visit at 37 weeks to assess overall control and provide delivery timing recommendations.  3. Recommend serial growth ultrasounds every 4 weeks starting at 28 weeks gestation.  4. Weekly  testing is recommended starting at 32 weeks IF medication is required OR there is a LGA growth pattern.  Twice weekly testing is recommended at 32 weeks if control is suboptimal, there is polyhydramnios, or medication is required AND there is a LGA  growth pattern.  5. Delivery is recommended at 39 weeks, though if glycemic control is suboptimal then delivery at 37-39 weeks may be considered.  6. If the EFW is >4500g at the time of delivery  should be considered.  7. A 2hr GTT is recommended 6 weeks postpartum (can be performed as soon as 2 days postpartum), if normal then screening for T2DM is recommended at least every 3 years.    Thank you for allowing us to participate in the care of your patient.  We anticipate she should be able to continue her care under your supervision. Please do not hesitate to contact us should any concerns arise.     D/w Dr. Hernan Simon MD PGY-3  Maternal Fetal Medicine     Attestation with edits by Stan Becerra MD at 2024  6:00 PM:  I saw and evaluated the patient. I personally obtained the key and critical portions of the history and physical exam or was physically present for key and critical portions performed by the resident/fellow. I reviewed the resident/fellow's documentation and discussed the patient with the resident/fellow. I agree with the resident/fellow's medical decision making as documented in the note.    Random BG acceptable today.  US normal.  Has not checked BG yet and so underwent diabetes education with DNE and will begin checking BG.  Supplies provider prior to leaving the office.  Will plan to RTC 1 week to assess glycemic control (though suspect control bessie be adequate based on fetal growth and random BG).  Desires follow up at [facility] and so will arrange.    Stan Becerra MD

## 2024-01-30 NOTE — ASSESSMENT & PLAN NOTE
"Shahrzad Walters is a 50 year old male who is being evaluated via a billable telephone visit.      The patient has been notified of following:     \"This telephone visit will be conducted via a call between you and your physician/provider. We have found that certain health care needs can be provided without the need for a physical exam.  This service lets us provide the care you need with a short phone conversation.  If a prescription is necessary we can send it directly to your pharmacy.  If lab work is needed we can place an order for that and you can then stop by our lab to have the test done at a later time.    Telephone visits are billed at different rates depending on your insurance coverage. During this emergency period, for some insurers they may be billed the same as an in-person visit.  Please reach out to your insurance provider with any questions.    If during the course of the call the physician/provider feels a telephone visit is not appropriate, you will not be charged for this service.\"    Patient has given verbal consent for Telephone visit?  Yes    What phone number would you like to be contacted at? [phone]    How would you like to obtain your AVS? [place]     Shahrzad Walters is a 50 year old male who presents via phone visit today for the following health issues:    HPI     Acute Illness  Acute illness concerns: cough  Onset/Duration: Last Thursday, 6 days ago.   Symptoms:  Fever: no  Chills/Sweats: no  Headache (location?): YES  Sinus Pressure: YES  Conjunctivitis:  YES- dry  Ear Pain: no  Rhinorrhea: YES  Congestion: YES  Sore Throat: YES  Cough: YES- dry and non-productive.   Wheeze: YES: But does smoke and feels his SOB/wheezing is at baseline  Decreased Appetite: YES  Nausea: YES  Vomiting: YES- Most concerning symptom for patient. This started Saturday, 4 days ago. He is not tolerating much for food without vomiting. He is tolerating fluids and urinating normally. " -Diabetes RN education   -BG supplies given, RTC 1 week for BG review and delivery planning  -Risks of diabetes in pregnancy were discussed at length including risks of macrosomia, increased risk of shoulder dystocia,  delivery, polyhydramnios, IUFD, and  hypoglycemia requiring NICU admission. Fetuses of diabetic mothers are also at increased risk of cardiac and skeletal anomalies, typically this is associated with pregestational diabetes. Per the HAPO study, fetal morbidity directly correlates with maternal glycemic control: improved glycemic control results in improved fetal outcomes.  - Women with diabetes in pregnancy are also at increased risk of hypertensive disease in pregnancy including gestational hypertension, pre-eclampsia.   - the patient received diabetes education today with Ese Gorman. Would recommend four times daily blood sugar checks with fasting and 1 hr pp blood sugars. Goals were reviewed again today with fasting bs < 95 and 1 hr post prandial blood sugars at < 140.   - Given patient did not have supplies to trend her BG, will recommend RTC in 1 week to assess glycemic control and delivery planning.     Diarrhea: YES- Yesterday only. Has not a BM today. Stool was normal in color (brown) and without blood or tar-like appearance.   Dysuria/Freq.: no  Dysuria or Hematuria: no  Fatigue/Achiness: YES-   Sick/Strep Exposure: no- Lives alone. 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   Therapies tried and outcome: Rest- not helpful.     Denies hx of asthma, COPD, pneumonia. Still has appendix and gallbladder.   Does report that he gets congested every fall and spring but this is usually just a runny nose. Denies abdominal pain. Does have hypertension but has not been taking his medications. He was seen in the ED on 9/29/2020 for  Alcohol withdrawal. At that time his BP was significantly elevated.    Last drink was 1 week ago. No other drug use reported.       Review of Systems   ROS not fully completed I am recommending that JJ see a provider in person       Objective          Vitals:  No vitals were obtained today due to virtual visit.    alert and cooperative  PSYCH: Alert and oriented times 3; coherent speech, normal   rate and volume, able to articulate logical thoughts, able   to abstract reason, no tangential thoughts, no hallucinations   or delusions  His affect is normal  RESP: No cough, no audible wheezing, able to talk in full sentences  Remainder of exam unable to be completed due to telephone visits        Assessment/Plan:  1. Nausea and vomiting, intractability of vomiting not specified, unspecified vomiting type  Given his strong history of alcohol abuse and recurrent emergency dept visits within past few months, I do not feel a telephone visit is sufficient to evaluate VANESA for his symptoms. I did review with him the supportive care for sinus/congestion/cough symptoms but feel that his vomiting is best evaluated in person. He feels that he can wait until tomorrow for an appointment but assures me that he will go  into emergency department if symptoms worsen. I have recommended he be seen in [place] by his PCP or associate.       2. Alcohol abuse      Phone call duration:  20 minutes

## 2024-01-30 NOTE — PROGRESS NOTES
2024   Reny Mendoza     M CONSULT NOTE  Referring Clinician: Martita VARGAS  Reason for consult: GDMA    HPI: Reny Mendoza is a 34 y.o.  at 36w3d here for consult for newly diagnosed GDM in the late 3rd trimester.     Patient reports Feeling well today Speaks Turkmen and  present on the [place]. Denies contractions, bleeding, or LOF. Reports normal fetal movement. Since getting her diagnosis of GDM outpatient has not had diabetes bootcamp and does not have supplies to check her BG. POCT 107 in the office today.    Pregnancy Notable For:  -AMA s/p cfDNA  -GDM, abnl 1hr and confirmed on 3h GTT        10 point review of system is negative except as above    OB History          3    Para   2    Term   2       0    AB   0    Living   2         SAB   0    IAB   0    Ectopic   0    Multiple   0    Live Births   2                   Past medical history: Denies HTN, DM, asthma, depression, or thyroid issues    Past Surgical History:   Procedure Laterality Date     SECTION, LOW TRANSVERSE      OOPHORECTOMY         Medications:     Medication Documentation Review Audit       Reviewed by Lisa Garzon LPN (Licensed Nurse) on 24 at 1433      Medication Order Taking? Sig Documenting Provider Last Dose Status   alcohol swabs (Alcohol Prep Pads) pads, medicated [id number]  Apply 1 Product topically 4 times a day. BLAISE Bah  Active   ferrous gluconate (Fergon) 240 (27 Fe) MG tablet [id number]  Take 2 tablets (480 mg) by mouth every other day. One pill twice a day every other day BLAISE Neville  Active   ferrous sulfate, 325 mg ferrous sulfate, tablet [id number]  Take 1 tablet by mouth once daily with a meal. BLAISE Bah  Active   lancets misc [id number]  Test blood sugar fasting in the morning, then 1hr after each meal (4 times per day) BLAISE Bah  Active   prenatal vitamin, iron-folic, 27 mg  iron-800 mcg folic acid tablet [id number]  Take 1 tablet by mouth once daily. Mitali BUCK Ta, APRN-CNM  Active                     No Known Allergies    Social History     Tobacco Use    Smoking status: Never    Smokeless tobacco: Current     Types: Chew   Vaping Use    Vaping Use: Never used   Substance Use Topics    Alcohol use: Never    Drug use: Never       family history includes Brain cancer in her sister; Tuberculosis in her father.      OBJECTIVE  Visit Vitals  /88   Pulse 106   Wt 74.8 kg (165 lb)   LMP 2023   BMI 32.22 kg/m²   OB Status Pregnant   Smoking Status Never   BSA 1.78 m²       Physical exam  Physical Examination  General: no acute distress  HEENT: normocephalic, atraumatic  Heart: warm and well perfused  Lungs: breathing comfortably on room air  Abdomen: soft, gravid, nontender  Extremities: moving all extremities  Neuro: awake and conversant  Psych: appropriate mood and affect   FHT: US    ASSESSMENT & PLAN    Reny Mendoza is a 34 y.o.  at 36w3d here for the following:    Gestational diabetes mellitus (GDM) in third trimester  Patient was recently diagnosed with gestational diabetes (GDM).  We discussed the pregnancy implications of the diagnosis including increased risk of pre-eclampsia, LGA/macrosomia, shoulder dystocia, stillbirth as well as  hypoglycemia, hyperbilirubinemia and respiratory distress.  We reviewed the importance of glycemic control and its impact on lowering these risks.  Discussed management ranging from dietary changes to pharmacotherapy and that insulin is considered first line therapy rather than oral agents if medication is ultimately needed.  Discussed our recommendation for serial growth ultrasounds and starting  testing at 32 weeks if treatment is required.  We also stressed the potential persistence of impaired glucose tolerance post pregnancy in up to 30% of patients and 50% risk of IGT/T2DM in the next 10 years with an  overall lifetime risk of T2DM of 70%. We reviewed the recommendation for postpartum screening at 6 weeks post-partum (can be performed as soon as 2 days after delivery) and, if normal, routine screening every 1-3 years. We did discuss that a healthy diet and maintenance of a normal body weight may reduce those risks  -Diabetes RN education   -BG supplies given, RTC 1 week for BG review and delivery planning  - US with normal growth and amniotic fluid.    Supervision of other normal pregnancy, antepartum  -PNL's reviewed, UTD.   -Encourage daily PNV use    Multigravida of advanced maternal age in third trimester  S/p cfDNA  Not seen by genetics       In summary the following is recommended:    1. We will continue to co-manage diabetes via the Bloodsugar line with weekly assessment of glycemic control.  Current regimen is: Diet  2. We will plan for a follow up MD visit at 37 weeks to assess overall control and provide delivery timing recommendations.  3. Recommend serial growth ultrasounds every 4 weeks starting at 28 weeks gestation.  4. Weekly  testing is recommended starting at 32 weeks IF medication is required OR there is a LGA growth pattern.  Twice weekly testing is recommended at 32 weeks if control is suboptimal, there is polyhydramnios, or medication is required AND there is a LGA growth pattern.  5. Delivery is recommended at 39 weeks, though if glycemic control is suboptimal then delivery at 37-39 weeks may be considered.  6. If the EFW is >4500g at the time of delivery  should be considered.  7. A 2hr GTT is recommended 6 weeks postpartum (can be performed as soon as 2 days postpartum), if normal then screening for T2DM is recommended at least every 3 years.    Thank you for allowing us to participate in the care of your patient.  We anticipate she should be able to continue her care under your supervision. Please do not hesitate to contact us should any concerns arise.     D/w   [place]    Randy Simon MD PGY-3  Maternal Fetal Medicine

## 2024-02-02 ENCOUNTER — APPOINTMENT (OUTPATIENT)
Dept: OBSTETRICS AND GYNECOLOGY | Facility: HOSPITAL | Age: 34
End: 2024-02-02
Payer: COMMERCIAL

## 2024-02-05 ENCOUNTER — ROUTINE PRENATAL (OUTPATIENT)
Dept: MATERNAL FETAL MEDICINE | Facility: HOSPITAL | Age: 34
End: 2024-02-05
Payer: COMMERCIAL

## 2024-02-05 VITALS — DIASTOLIC BLOOD PRESSURE: 81 MMHG | SYSTOLIC BLOOD PRESSURE: 119 MMHG | BODY MASS INDEX: 32.61 KG/M2 | WEIGHT: 167 LBS

## 2024-02-05 DIAGNOSIS — O09.523 MULTIGRAVIDA OF ADVANCED MATERNAL AGE IN THIRD TRIMESTER (HHS-HCC): ICD-10-CM

## 2024-02-05 DIAGNOSIS — Z34.80 SUPERVISION OF OTHER NORMAL PREGNANCY, ANTEPARTUM (HHS-HCC): ICD-10-CM

## 2024-02-05 DIAGNOSIS — R35.0 FREQUENCY OF URINATION: Primary | ICD-10-CM

## 2024-02-05 DIAGNOSIS — Z98.891 HX OF CESAREAN SECTION: ICD-10-CM

## 2024-02-05 DIAGNOSIS — O24.415 GESTATIONAL DIABETES MELLITUS (GDM) IN THIRD TRIMESTER CONTROLLED ON ORAL HYPOGLYCEMIC DRUG (HHS-HCC): ICD-10-CM

## 2024-02-05 DIAGNOSIS — O99.012 ANEMIA DURING PREGNANCY IN SECOND TRIMESTER (HHS-HCC): ICD-10-CM

## 2024-02-05 DIAGNOSIS — Z87.59 HISTORY OF POSTPARTUM HEMORRHAGE: ICD-10-CM

## 2024-02-05 DIAGNOSIS — Z3A.37 37 WEEKS GESTATION OF PREGNANCY (HHS-HCC): ICD-10-CM

## 2024-02-05 PROCEDURE — 87086 URINE CULTURE/COLONY COUNT: CPT | Performed by: STUDENT IN AN ORGANIZED HEALTH CARE EDUCATION/TRAINING PROGRAM

## 2024-02-05 PROCEDURE — 99214 OFFICE O/P EST MOD 30 MIN: CPT | Performed by: STUDENT IN AN ORGANIZED HEALTH CARE EDUCATION/TRAINING PROGRAM

## 2024-02-05 NOTE — PROGRESS NOTES
We had the pleasure of seeing your patient Reny Mendoza in the Maternal-Fetal Medicine office at [facility] on 24. As you know, Reny Mendoza is a 33 yo  @ 37w2d who presents for a follow-up consultation for delivery planning in the setting of gestational diabetes.    Reny has been providing glycemic profile data to our practice regularly. Her glucose control has been appropriate on diet-alone. She continues to check her blood sugars 4-times per day and has the vast majority of her values within our target ranges. At 36w3d she had an ultrasound that was notable for a vertex fetus with an estimated fetal weight of 2604 g (22%). Her YARELY was normal at 11.2 cm.     Today Reny is doing well. She reports good fetal movement and denies leakage of fluid or vaginal bleeding.    Given these favorable findings, we would recommend expectant management until 39 weeks gestation. Reny is considering a trial of labor after  (TOLAC). She reports she had one prior vaginal delivery and one prior  delivery for breech presentation. We briefly discussed the 0.7% risk of uterine rupture and risk of a fetal anoxic brain injury.     If she labors, her glucose values should be monitored every 2-4 hours in latent phase, and hourly in active phase, with a goal of  throughout labor.     After delivery, her glucose values do not need to be monitored. She should have a formal test for permanent diabetes prescribed at her post-partum check-up (to be completed between 4-6 weeks) and every 1-2 years thereafter. If she develops permanent diabetes, her hemoglobin A1c should be brought into the non-diabetic range prior to any future conception to help minimize any negative impact on the pregnancy.    At the end of our conversation, Reny seemed to have a good understanding of our recommendations. She plans on continuing to follow-up with you for her routine prenatal care. She will also continue weekly  communication with our office to report her blood sugars. Thank you for allowing us to participate in Reny's pregnancy care. If you have any questions or concerns regarding our assessment and recommendations for her delivery, please do not hesitate to contact me.      Jose Quinn MD  Maternal-Fetal Medicine

## 2024-02-06 LAB — BACTERIA UR CULT: NORMAL

## 2024-02-12 ENCOUNTER — ROUTINE PRENATAL (OUTPATIENT)
Dept: OBSTETRICS AND GYNECOLOGY | Facility: HOSPITAL | Age: 34
End: 2024-02-12
Payer: COMMERCIAL

## 2024-02-12 ENCOUNTER — PROCEDURE VISIT (OUTPATIENT)
Dept: OBSTETRICS AND GYNECOLOGY | Facility: HOSPITAL | Age: 34
End: 2024-02-12
Payer: COMMERCIAL

## 2024-02-12 VITALS — DIASTOLIC BLOOD PRESSURE: 83 MMHG | SYSTOLIC BLOOD PRESSURE: 126 MMHG

## 2024-02-12 DIAGNOSIS — O99.012 ANEMIA DURING PREGNANCY IN SECOND TRIMESTER (HHS-HCC): ICD-10-CM

## 2024-02-12 DIAGNOSIS — O24.410 DIET CONTROLLED GESTATIONAL DIABETES MELLITUS (GDM) IN THIRD TRIMESTER (HHS-HCC): ICD-10-CM

## 2024-02-12 DIAGNOSIS — O09.523 MULTIGRAVIDA OF ADVANCED MATERNAL AGE IN THIRD TRIMESTER (HHS-HCC): ICD-10-CM

## 2024-02-12 DIAGNOSIS — O09.523 MULTIGRAVIDA OF ADVANCED MATERNAL AGE IN THIRD TRIMESTER (HHS-HCC): Primary | ICD-10-CM

## 2024-02-12 DIAGNOSIS — Z3A.38 38 WEEKS GESTATION OF PREGNANCY (HHS-HCC): Primary | ICD-10-CM

## 2024-02-12 PROCEDURE — 99215 OFFICE O/P EST HI 40 MIN: CPT | Mod: TH,25,27

## 2024-02-12 PROCEDURE — 99215 OFFICE O/P EST HI 40 MIN: CPT

## 2024-02-12 PROCEDURE — 87081 CULTURE SCREEN ONLY: CPT

## 2024-02-12 RX ORDER — QUINIDINE GLUCONATE 324 MG
480 TABLET, EXTENDED RELEASE ORAL EVERY OTHER DAY
Qty: 90 TABLET | Refills: 3 | Status: SHIPPED | OUTPATIENT
Start: 2024-02-12 | End: 2024-02-21 | Stop reason: HOSPADM

## 2024-02-12 NOTE — PROCEDURES
Reny Mendoza, a  at 38w2d with an BLAKE of 2024, by Ultrasound, was seen at [facility] for a nonstress test.    Non-Stress Test   Baseline Fetal Heart Rate for Non-Stress Test: 135 BPM  Variability in Waveform for Non-Stress Test: Moderate  Accelerations in Non-Stress Test: Yes  Decelerations in Non-Stress Test: None  Contractions in Non-Stress Test: Not present  Acoustic Stimulator for Non-Stress Test: Yes (x1)  Interpretation of Non-Stress Test   Interpretation of Non-Stress Test: Reactive     For IOL on  @0800 for GDMA1    Mitali Chappell, ALICIA-TOBI

## 2024-02-12 NOTE — PROGRESS NOTES
Assessment/Plan   Problem List Items Addressed This Visit             ICD-10-CM    Anemia during pregnancy in second trimester O99.012    Relevant Medications    ferrous gluconate (Fergon) 240 (27 Fe) MG tablet    Multigravida of advanced maternal age in third trimester O09.523    Relevant Orders    Fetal nonstress test    Gestational diabetes mellitus (GDM) in third trimester O24.419    Relevant Orders    Labor Induction     Other Visit Diagnoses         Codes    38 weeks gestation of pregnancy    -  Primary Z3A.38    Relevant Orders    Group B Streptococcus (GBS) Prenatal Screen, Culture          Patient at appointment with her sister.  Modesta  used for this visit    Patient desires TOLAC. Risks/benefits of TOLAC vs. scheduled rCS reviewed; MFMU = 73.2. TOLAC consent signed 24  Discussed routine GBS screening, to be completed today   surveillance weekly for BMI; GDMA1  IOL scheduled  @0800 for GDMA1  Questions regarding IV iron infusions that was discussed at a previous appointment.  Hemoglobin on  was 9.9 and was started on iron supplements.  Latest Hemoglobin on  was 11.6.  Counseled patient that she does not require IV iron transfusions at this time and to keep taking PO iron.  Patient verbalized understanding.  Patient brought BG log with her today.  Fasting and 1hr postprandial blood sugars remain in good control with diet with fasting ranging in the 80s-90s and the highest 1hr postprandial blood sugar resulting in 141.  Patient remains compliant and is doing well.  Will continue to monitor patient and fetal status.  No other concerns today  Reviewed s/sx of labor, warning signs, fetal movement counts, and when to call provider  Follow up for IOL    Mitali Chappell, ALICIA-TOBI    Subjective     Reny Mendoza is a 34 y.o.  at 38w2d with a working estimated date of delivery of 2024, by Ultrasound who presents for a routine prenatal visit. She denies vaginal  bleeding, leakage of fluid, decreased fetal movements, or contractions.    Her pregnancy is complicated by:  Pregnancy Problems (from 23 to present)       Problem Noted Resolved    Hx of  section 2024 by BLAISE Neville No    Priority:  Medium      Overview Addendum 2024  4:43 PM by BLAISE Neville     2016 LTCS for breech  Unsure if she would like to  or not          History of postpartum hemorrhage 2024 by BLAISE Neville No    Priority:  Medium      Overview Signed 2024  4:44 PM by BLAISE Neville     With 2016 pLTCS  S/p blood transfusion   Type and cross on admission          Multigravida of advanced maternal age in third trimester 2024 by BLAISE Neville No    Priority:  Medium      Overview Signed 2024  4:07 PM by BLAISE Neville     Not seen by genetics         Anemia during pregnancy in second trimester 2023 by BLAISE Bah No    Priority:  Medium      Overview Addendum 2024  4:03 PM by BLAISE Neville     Lab Results   Component Value Date    WBC 6.9 2023    HGB 9.9 (L) 2023    HCT 30.4 (L) 2023    MCV 96 2023     2023   Folate 4  Ferritin 20  Patient unsure if she's taking that at 32w; didn't bring medications with her; rx resent   Recheck CBC next visit           Supervision of other normal pregnancy, antepartum 10/23/2023 by Jessica Trevino No    Priority:  Medium      Overview Addendum 2024  4:06 PM by BLAISE Neville     Desired provider in labor: [] CNM  [] Physician  [x] Dated by:  22w US not c/w LMP   [] Initial BMI:   [x] Prenatal Labs: WNL  [x] Rh status: A+  [] Genetic Screening:    [x] Baby ASA: not on    [x] Anatomy US: WNl  [x] Fetal Sex: male, declines circ  [] Patient added to BirthTracks  [x] 1hr GCT at 24-28wks: 23 139  [] 3 hr GTT (if  indicated):  [] Fetal Surveillance (if indicated):    [x] Tdap (27-36wks):  [x] Flu Shot:   [] COVID vaccine:     [] Breastfeeding:  [] Pain management during labor:   [] Postpartum Birth control method:   [] Labor Support:   [] GBS at 36 wks:         Gestational diabetes mellitus (GDM) in third trimester 2024 by Randy Simon MD No    Overview Signed 2024  4:03 PM by Randy Simon MD     -Diabetes RN education   -BG supplies given, RTC 1 week for BG review and delivery planning                  Objective   Physical Exam:      TWG: Not found.  Expected Total Weight Gain: Could not be calculated   Pregravid BMI: Could not be calculated  BP: 126/83  Fetal Heart Rate: 145 Fundal Height (cm): 37 cm Presentation: Vertex           Postpartum Depression: Medium Risk (2023)    [place]  Depression Scale     Last EPDS Total Score: 6     Last EPDS Self Harm Result: Never        Prenatal Labs  Lab Results   Component Value Date    HGB 11.6 (L) 2024    HCT 35.7 (L) 2024     2024    ABO A 2023    LABRH POS 2023    NEISSGONOAMP NEGATIVE 2023    CHLAMTRACAMP NEGATIVE 2023    SYPHT Nonreactive 2023    HEPBSAG NONREACTIVE 2023    HIV1X2 NONREACTIVE 2023    URINECULTURE Normal genitourinary jovi 2024     Lab Results   Component Value Date    GLUC1P 139 (H) 2023     Imaging  The most recent ultrasound was performed on 24 with a study GA of 36.3 and EFW 22%, AC 15%.

## 2024-02-14 LAB — GP B STREP GENITAL QL CULT: NORMAL

## 2024-02-18 ENCOUNTER — ANESTHESIA (OUTPATIENT)
Dept: OBSTETRICS AND GYNECOLOGY | Facility: HOSPITAL | Age: 34
End: 2024-02-18
Payer: COMMERCIAL

## 2024-02-18 ENCOUNTER — ANESTHESIA EVENT (OUTPATIENT)
Dept: OBSTETRICS AND GYNECOLOGY | Facility: HOSPITAL | Age: 34
End: 2024-02-18
Payer: COMMERCIAL

## 2024-02-18 ENCOUNTER — HOSPITAL ENCOUNTER (INPATIENT)
Facility: HOSPITAL | Age: 34
LOS: 3 days | Discharge: HOME | End: 2024-02-21
Attending: STUDENT IN AN ORGANIZED HEALTH CARE EDUCATION/TRAINING PROGRAM | Admitting: STUDENT IN AN ORGANIZED HEALTH CARE EDUCATION/TRAINING PROGRAM
Payer: COMMERCIAL

## 2024-02-18 ENCOUNTER — APPOINTMENT (OUTPATIENT)
Dept: OBSTETRICS AND GYNECOLOGY | Facility: HOSPITAL | Age: 34
End: 2024-02-18
Payer: COMMERCIAL

## 2024-02-18 DIAGNOSIS — Z22.7 LATENT TUBERCULOSIS: Primary | ICD-10-CM

## 2024-02-18 DIAGNOSIS — O24.410 DIET CONTROLLED GESTATIONAL DIABETES MELLITUS (GDM) IN THIRD TRIMESTER (HHS-HCC): ICD-10-CM

## 2024-02-18 DIAGNOSIS — Z30.017 NEXPLANON INSERTION: ICD-10-CM

## 2024-02-18 DIAGNOSIS — O13.9 GESTATIONAL HYPERTENSION, UNSPECIFIED TRIMESTER (HHS-HCC): ICD-10-CM

## 2024-02-18 PROBLEM — M06.9 RHEUMATOID ARTHRITIS (MULTI): Status: ACTIVE | Noted: 2024-02-18

## 2024-02-18 PROBLEM — K21.9 GASTROESOPHAGEAL REFLUX DISEASE: Status: ACTIVE | Noted: 2024-02-18

## 2024-02-18 LAB
ABO GROUP (TYPE) IN BLOOD: NORMAL
ANTIBODY SCREEN: NORMAL
ERYTHROCYTE [DISTWIDTH] IN BLOOD BY AUTOMATED COUNT: 14.6 % (ref 11.5–14.5)
GLUCOSE BLD MANUAL STRIP-MCNC: 109 MG/DL (ref 74–99)
GLUCOSE BLD MANUAL STRIP-MCNC: 110 MG/DL (ref 74–99)
GLUCOSE BLD MANUAL STRIP-MCNC: 112 MG/DL (ref 74–99)
GLUCOSE BLD MANUAL STRIP-MCNC: 123 MG/DL (ref 74–99)
HCT VFR BLD AUTO: 35.5 % (ref 36–46)
HGB BLD-MCNC: 12 G/DL (ref 12–16)
MCH RBC QN AUTO: 30.9 PG (ref 26–34)
MCHC RBC AUTO-ENTMCNC: 33.8 G/DL (ref 32–36)
MCV RBC AUTO: 92 FL (ref 80–100)
NRBC BLD-RTO: 0 /100 WBCS (ref 0–0)
PLATELET # BLD AUTO: 229 X10*3/UL (ref 150–450)
RBC # BLD AUTO: 3.88 X10*6/UL (ref 4–5.2)
RH FACTOR (ANTIGEN D): NORMAL
TREPONEMA PALLIDUM IGG+IGM AB [PRESENCE] IN SERUM OR PLASMA BY IMMUNOASSAY: NONREACTIVE
WBC # BLD AUTO: 8.4 X10*3/UL (ref 4.4–11.3)

## 2024-02-18 PROCEDURE — 51701 INSERT BLADDER CATHETER: CPT

## 2024-02-18 PROCEDURE — 7210000002 HC LABOR PER HOUR

## 2024-02-18 PROCEDURE — 10907ZC DRAINAGE OF AMNIOTIC FLUID, THERAPEUTIC FROM PRODUCTS OF CONCEPTION, VIA NATURAL OR ARTIFICIAL OPENING: ICD-10-PCS | Performed by: ADVANCED PRACTICE MIDWIFE

## 2024-02-18 PROCEDURE — 86780 TREPONEMA PALLIDUM: CPT

## 2024-02-18 PROCEDURE — 36415 COLL VENOUS BLD VENIPUNCTURE: CPT

## 2024-02-18 PROCEDURE — 2500000002 HC RX 250 W HCPCS SELF ADMINISTERED DRUGS (ALT 637 FOR MEDICARE OP, ALT 636 FOR OP/ED)

## 2024-02-18 PROCEDURE — 85027 COMPLETE CBC AUTOMATED: CPT

## 2024-02-18 PROCEDURE — 3E033VJ INTRODUCTION OF OTHER HORMONE INTO PERIPHERAL VEIN, PERCUTANEOUS APPROACH: ICD-10-PCS | Performed by: ADVANCED PRACTICE MIDWIFE

## 2024-02-18 PROCEDURE — 1120000001 HC OB PRIVATE ROOM DAILY

## 2024-02-18 PROCEDURE — 2500000005 HC RX 250 GENERAL PHARMACY W/O HCPCS

## 2024-02-18 PROCEDURE — 01967 NEURAXL LBR ANES VAG DLVR: CPT | Performed by: ANESTHESIOLOGY

## 2024-02-18 PROCEDURE — 86901 BLOOD TYPING SEROLOGIC RH(D): CPT

## 2024-02-18 PROCEDURE — 86920 COMPATIBILITY TEST SPIN: CPT

## 2024-02-18 PROCEDURE — 82947 ASSAY GLUCOSE BLOOD QUANT: CPT

## 2024-02-18 PROCEDURE — 2500000004 HC RX 250 GENERAL PHARMACY W/ HCPCS (ALT 636 FOR OP/ED)

## 2024-02-18 PROCEDURE — 59050 FETAL MONITOR W/REPORT: CPT

## 2024-02-18 RX ORDER — MISOPROSTOL 200 UG/1
800 TABLET ORAL ONCE AS NEEDED
Status: DISCONTINUED | OUTPATIENT
Start: 2024-02-18 | End: 2024-02-19

## 2024-02-18 RX ORDER — OXYTOCIN 10 [USP'U]/ML
10 INJECTION, SOLUTION INTRAMUSCULAR; INTRAVENOUS ONCE AS NEEDED
Status: DISCONTINUED | OUTPATIENT
Start: 2024-02-18 | End: 2024-02-19

## 2024-02-18 RX ORDER — LOPERAMIDE HYDROCHLORIDE 2 MG/1
4 CAPSULE ORAL EVERY 2 HOUR PRN
Status: DISCONTINUED | OUTPATIENT
Start: 2024-02-18 | End: 2024-02-19

## 2024-02-18 RX ORDER — ONDANSETRON HYDROCHLORIDE 2 MG/ML
4 INJECTION, SOLUTION INTRAVENOUS EVERY 6 HOURS PRN
Status: DISCONTINUED | OUTPATIENT
Start: 2024-02-18 | End: 2024-02-19

## 2024-02-18 RX ORDER — DEXTROSE 40 %
30 GEL (GRAM) ORAL
Status: DISCONTINUED | OUTPATIENT
Start: 2024-02-18 | End: 2024-02-19

## 2024-02-18 RX ORDER — LIDOCAINE HYDROCHLORIDE 10 MG/ML
30 INJECTION INFILTRATION; PERINEURAL ONCE AS NEEDED
Status: DISCONTINUED | OUTPATIENT
Start: 2024-02-18 | End: 2024-02-19

## 2024-02-18 RX ORDER — INSULIN LISPRO 100 [IU]/ML
0-10 INJECTION, SOLUTION INTRAVENOUS; SUBCUTANEOUS EVERY 4 HOURS
Status: DISCONTINUED | OUTPATIENT
Start: 2024-02-18 | End: 2024-02-19

## 2024-02-18 RX ORDER — DEXTROSE 50 % IN WATER (D50W) INTRAVENOUS SYRINGE
25
Status: DISCONTINUED | OUTPATIENT
Start: 2024-02-18 | End: 2024-02-19

## 2024-02-18 RX ORDER — CARBOPROST TROMETHAMINE 250 UG/ML
250 INJECTION, SOLUTION INTRAMUSCULAR ONCE AS NEEDED
Status: DISCONTINUED | OUTPATIENT
Start: 2024-02-18 | End: 2024-02-19

## 2024-02-18 RX ORDER — DEXTROSE 40 %
15 GEL (GRAM) ORAL
Status: DISCONTINUED | OUTPATIENT
Start: 2024-02-18 | End: 2024-02-19

## 2024-02-18 RX ORDER — METOCLOPRAMIDE 10 MG/1
10 TABLET ORAL EVERY 6 HOURS PRN
Status: DISCONTINUED | OUTPATIENT
Start: 2024-02-18 | End: 2024-02-19

## 2024-02-18 RX ORDER — OXYTOCIN/0.9 % SODIUM CHLORIDE 30/500 ML
60 PLASTIC BAG, INJECTION (ML) INTRAVENOUS ONCE AS NEEDED
Status: DISCONTINUED | OUTPATIENT
Start: 2024-02-18 | End: 2024-02-19

## 2024-02-18 RX ORDER — TRANEXAMIC ACID 100 MG/ML
1000 INJECTION, SOLUTION INTRAVENOUS ONCE AS NEEDED
Status: DISCONTINUED | OUTPATIENT
Start: 2024-02-18 | End: 2024-02-19

## 2024-02-18 RX ORDER — ONDANSETRON 4 MG/1
4 TABLET, FILM COATED ORAL EVERY 6 HOURS PRN
Status: DISCONTINUED | OUTPATIENT
Start: 2024-02-18 | End: 2024-02-19

## 2024-02-18 RX ORDER — SODIUM CHLORIDE, SODIUM LACTATE, POTASSIUM CHLORIDE, CALCIUM CHLORIDE 600; 310; 30; 20 MG/100ML; MG/100ML; MG/100ML; MG/100ML
125 INJECTION, SOLUTION INTRAVENOUS CONTINUOUS
Status: DISCONTINUED | OUTPATIENT
Start: 2024-02-18 | End: 2024-02-19

## 2024-02-18 RX ORDER — METHYLERGONOVINE MALEATE 0.2 MG/ML
0.2 INJECTION INTRAVENOUS ONCE AS NEEDED
Status: DISCONTINUED | OUTPATIENT
Start: 2024-02-18 | End: 2024-02-19

## 2024-02-18 RX ORDER — LABETALOL HYDROCHLORIDE 5 MG/ML
20 INJECTION, SOLUTION INTRAVENOUS ONCE AS NEEDED
Status: DISCONTINUED | OUTPATIENT
Start: 2024-02-18 | End: 2024-02-19

## 2024-02-18 RX ORDER — NIFEDIPINE 10 MG/1
10 CAPSULE ORAL ONCE AS NEEDED
Status: DISCONTINUED | OUTPATIENT
Start: 2024-02-18 | End: 2024-02-19

## 2024-02-18 RX ORDER — METOCLOPRAMIDE HYDROCHLORIDE 5 MG/ML
10 INJECTION INTRAMUSCULAR; INTRAVENOUS EVERY 6 HOURS PRN
Status: DISCONTINUED | OUTPATIENT
Start: 2024-02-18 | End: 2024-02-19

## 2024-02-18 RX ORDER — FENTANYL/BUPIVACAINE/NS/PF 2MCG/ML-.1
PLASTIC BAG, INJECTION (ML) INJECTION CONTINUOUS PRN
Status: DISCONTINUED | OUTPATIENT
Start: 2024-02-18 | End: 2024-02-19

## 2024-02-18 RX ORDER — HYDRALAZINE HYDROCHLORIDE 20 MG/ML
5 INJECTION INTRAMUSCULAR; INTRAVENOUS ONCE AS NEEDED
Status: DISCONTINUED | OUTPATIENT
Start: 2024-02-18 | End: 2024-02-19

## 2024-02-18 RX ORDER — TERBUTALINE SULFATE 1 MG/ML
0.25 INJECTION SUBCUTANEOUS ONCE AS NEEDED
Status: DISCONTINUED | OUTPATIENT
Start: 2024-02-18 | End: 2024-02-19

## 2024-02-18 RX ORDER — OXYTOCIN/0.9 % SODIUM CHLORIDE 30/500 ML
2-30 PLASTIC BAG, INJECTION (ML) INTRAVENOUS CONTINUOUS
Status: DISCONTINUED | OUTPATIENT
Start: 2024-02-18 | End: 2024-02-19

## 2024-02-18 RX ORDER — FENTANYL/BUPIVACAINE/NS/PF 2MCG/ML-.1
PLASTIC BAG, INJECTION (ML) INJECTION AS NEEDED
Status: DISCONTINUED | OUTPATIENT
Start: 2024-02-18 | End: 2024-02-19

## 2024-02-18 RX ADMIN — Medication 5 ML: at 19:12

## 2024-02-18 RX ADMIN — SODIUM CHLORIDE, POTASSIUM CHLORIDE, SODIUM LACTATE AND CALCIUM CHLORIDE 125 ML/HR: 600; 310; 30; 20 INJECTION, SOLUTION INTRAVENOUS at 22:48

## 2024-02-18 RX ADMIN — Medication 14 ML/HR: at 19:16

## 2024-02-18 RX ADMIN — INSULIN LISPRO 2 UNITS: 100 INJECTION, SOLUTION INTRAVENOUS; SUBCUTANEOUS at 22:10

## 2024-02-18 RX ADMIN — Medication 2.5 ML: at 19:16

## 2024-02-18 RX ADMIN — SODIUM CHLORIDE, POTASSIUM CHLORIDE, SODIUM LACTATE AND CALCIUM CHLORIDE 125 ML/HR: 600; 310; 30; 20 INJECTION, SOLUTION INTRAVENOUS at 09:49

## 2024-02-18 RX ADMIN — Medication 2 MILLI-UNITS/MIN: at 12:30

## 2024-02-18 RX ADMIN — SODIUM CHLORIDE, POTASSIUM CHLORIDE, SODIUM LACTATE AND CALCIUM CHLORIDE 125 ML/HR: 600; 310; 30; 20 INJECTION, SOLUTION INTRAVENOUS at 17:45

## 2024-02-18 SDOH — HEALTH STABILITY: MENTAL HEALTH: SUICIDAL BEHAVIOR (LIFETIME): NO

## 2024-02-18 SDOH — SOCIAL STABILITY: SOCIAL INSECURITY: DO YOU FEEL ANYONE HAS EXPLOITED OR TAKEN ADVANTAGE OF YOU FINANCIALLY OR OF YOUR PERSONAL PROPERTY?: NO

## 2024-02-18 SDOH — SOCIAL STABILITY: SOCIAL INSECURITY: ARE YOU OR HAVE YOU BEEN THREATENED OR ABUSED PHYSICALLY, EMOTIONALLY, OR SEXUALLY BY ANYONE?: NO

## 2024-02-18 SDOH — SOCIAL STABILITY: SOCIAL INSECURITY: ARE THERE ANY APPARENT SIGNS OF INJURIES/BEHAVIORS THAT COULD BE RELATED TO ABUSE/NEGLECT?: NO

## 2024-02-18 SDOH — SOCIAL STABILITY: SOCIAL INSECURITY: DOES ANYONE TRY TO KEEP YOU FROM HAVING/CONTACTING OTHER FRIENDS OR DOING THINGS OUTSIDE YOUR HOME?: NO

## 2024-02-18 SDOH — SOCIAL STABILITY: SOCIAL INSECURITY: PHYSICAL ABUSE: DENIES

## 2024-02-18 SDOH — ECONOMIC STABILITY: HOUSING INSECURITY: DO YOU FEEL UNSAFE GOING BACK TO THE PLACE WHERE YOU ARE LIVING?: NO

## 2024-02-18 SDOH — SOCIAL STABILITY: SOCIAL INSECURITY: HAVE YOU HAD THOUGHTS OF HARMING ANYONE ELSE?: NO

## 2024-02-18 SDOH — SOCIAL STABILITY: SOCIAL INSECURITY: ABUSE SCREEN: ADULT

## 2024-02-18 SDOH — SOCIAL STABILITY: SOCIAL INSECURITY: HAS ANYONE EVER THREATENED TO HURT YOUR FAMILY OR YOUR PETS?: NO

## 2024-02-18 SDOH — HEALTH STABILITY: MENTAL HEALTH: NON-SPECIFIC ACTIVE SUICIDAL THOUGHTS (PAST 1 MONTH): NO

## 2024-02-18 SDOH — HEALTH STABILITY: MENTAL HEALTH: CURRENT SMOKER: 0

## 2024-02-18 SDOH — HEALTH STABILITY: MENTAL HEALTH: WERE YOU ABLE TO COMPLETE ALL THE BEHAVIORAL HEALTH SCREENINGS?: YES

## 2024-02-18 SDOH — HEALTH STABILITY: MENTAL HEALTH: WISH TO BE DEAD (PAST 1 MONTH): NO

## 2024-02-18 SDOH — SOCIAL STABILITY: SOCIAL INSECURITY: VERBAL ABUSE: DENIES

## 2024-02-18 ASSESSMENT — PATIENT HEALTH QUESTIONNAIRE - PHQ9
SUM OF ALL RESPONSES TO PHQ9 QUESTIONS 1 & 2: 0
2. FEELING DOWN, DEPRESSED OR HOPELESS: NOT AT ALL
1. LITTLE INTEREST OR PLEASURE IN DOING THINGS: NOT AT ALL

## 2024-02-18 ASSESSMENT — LIFESTYLE VARIABLES
SKIP TO QUESTIONS 9-10: 1
HOW OFTEN DO YOU HAVE A DRINK CONTAINING ALCOHOL: NEVER
AUDIT-C TOTAL SCORE: 0
HOW MANY STANDARD DRINKS CONTAINING ALCOHOL DO YOU HAVE ON A TYPICAL DAY: PATIENT DOES NOT DRINK
AUDIT-C TOTAL SCORE: 0
HOW OFTEN DO YOU HAVE 6 OR MORE DRINKS ON ONE OCCASION: NEVER

## 2024-02-18 ASSESSMENT — PAIN SCALES - GENERAL
PAINLEVEL_OUTOF10: 6
PAINLEVEL_OUTOF10: 0 - NO PAIN
PAINLEVEL_OUTOF10: 4
PAINLEVEL_OUTOF10: 3
PAINLEVEL_OUTOF10: 4
PAINLEVEL_OUTOF10: 3
PAINLEVEL_OUTOF10: 0 - NO PAIN
PAINLEVEL_OUTOF10: 3
PAINLEVEL_OUTOF10: 4
PAINLEVEL_OUTOF10: 0 - NO PAIN

## 2024-02-18 ASSESSMENT — ACTIVITIES OF DAILY LIVING (ADL): LACK_OF_TRANSPORTATION: NO

## 2024-02-18 NOTE — ANESTHESIA PREPROCEDURE EVALUATION
Patient: Vale Mendoza    34 year old female  at 39w1d presenting for TOLAC.    Evaluation Method: In-person visit    Procedure Information    Date: 24  Procedure: Labor Analgesia     34 y.o.  at 39w1d      Relevant Problems   Anesthesia (within normal limits)      Cardiovascular (within normal limits)      Endocrine   (+) Gestational diabetes mellitus (GDM) in third trimester      GI   (+) Gastroesophageal reflux disease      /Renal (within normal limits)      Neuro/Psych (within normal limits)      Pulmonary (within normal limits)      GI/Hepatic (within normal limits)      Hematology   (+) Anemia during pregnancy in second trimester      Musculoskeletal (within normal limits)      Infectious Disease   (+) Latent tuberculosis     Past Surgical History:   Procedure Laterality Date     SECTION, LOW TRANSVERSE      OOPHORECTOMY       No anesthesia complications.    Clinical information reviewed:    Allergies  Meds               NPO Detail:  No data recorded     OB/Gyn Evaluation    Present Pregnancy    Patient is pregnant now.  (+) , previous  section   Obstetric History    (+)  section, vaginal birth after               Vitals:    24 1756   BP: 118/76   Pulse: 95   Resp: 18   Temp: 36.8 °C (98.2 °F)   SpO2: 97%       Past Surgical History:   Procedure Laterality Date     SECTION, LOW TRANSVERSE      OOPHORECTOMY       Past Medical History:   Diagnosis Date    Abnormal Pap smear of cervix     AMA (advanced maternal age) multigravida 35+     Latent tuberculosis     Migraine        Current Facility-Administered Medications:     carboprost (Hemabate) injection 250 mcg, 250 mcg, intramuscular, Once PRN, Loyda Fonseca MD    dextrose 50 % injection 25 g, 25 g, intravenous, q20 min PRN, Loyda Fonseca MD    glucagon (Glucagen) injection 1 mg, 1 mg, intramuscular, q20 min PRN, Loyda Fonseca MD    glucose (Glutose) 40 % oral gel 15 g, 15 g, oral, q20 min PRN,  Loyda Fonseca MD    glucose (Glutose) 40 % oral gel 30 g, 30 g, oral, q20 min PRN, Loyda Fonseca MD    hydrALAZINE (Apresoline) injection 5 mg, 5 mg, intravenous, Once PRN, Loyda Fonseca MD    insulin lispro (HumaLOG) injection 0-10 Units, 0-10 Units, subcutaneous, q4h, Loyda Fonseca MD    labetaloL (Normodyne,Trandate) injection 20 mg, 20 mg, intravenous, Once PRN, Loyda Fosneca MD    lactated Ringer's bolus 500 mL, 500 mL, intravenous, Once PRN, Loyda Fonseca MD    lactated Ringer's bolus 500 mL, 500 mL, intravenous, Once PRN, Loyda Fonseca MD    lactated Ringer's infusion, 125 mL/hr, intravenous, Continuous, Loyda Fonseca MD, Last Rate: 125 mL/hr at 02/18/24 1745, 125 mL/hr at 02/18/24 1745    lidocaine (Xylocaine) 10 mg/mL (1 %) injection 300 mg, 30 mL, subcutaneous, Once PRN, Loyda Fonseca MD    loperamide (Imodium) capsule 4 mg, 4 mg, oral, q2h PRN, Loyda Fonseca MD    methylergonovine (Methergine) injection 0.2 mg, 0.2 mg, intramuscular, Once PRN, Loyda Fonseca MD    metoclopramide (Reglan) tablet 10 mg, 10 mg, oral, q6h PRN **OR** metoclopramide (Reglan) injection 10 mg, 10 mg, intravenous, q6h PRN, Loyda Fonseca MD    miSOPROStoL (Cytotec) tablet 800 mcg, 800 mcg, rectal, Once PRN, Loyda Fonseca MD    NIFEdipine (Procardia) capsule 10 mg, 10 mg, oral, Once PRN, Loyda Fonseca MD    ondansetron (Zofran) tablet 4 mg, 4 mg, oral, q6h PRN **OR** ondansetron (Zofran) injection 4 mg, 4 mg, intravenous, q6h PRN, Loyda Fonseca MD    oxytocin (Pitocin) bolus from bag, 600 ortiz-units/min, intravenous, Once PRN, Loyda Fonseca MD    oxytocin (Pitocin) bolus from bag, 600 ortiz-units/min, intravenous, Once PRN, Loyda Fonseca MD    oxytocin (Pitocin) infusion in sodium chloride 0.9% 30 units/500 mL, 60 ortiz-units/min, intravenous, Once PRN, Loyda Fonseca MD    oxytocin (Pitocin) infusion in sodium chloride 0.9% 30 units/500 mL, 2-30 ortiz-units/min, intravenous, Continuous, Loyda Fonseca MD, Last Rate: 6 mL/hr at  02/18/24 1830, 6 ortiz-units/min at 02/18/24 1830    oxytocin (Pitocin) injection 10 Units, 10 Units, intramuscular, Once PRN, Loyda Fonseca MD    oxytocin (Pitocin) injection 10 Units, 10 Units, intramuscular, Once PRN, Loyda Fonseca MD    terbutaline (Brethine) injection 0.25 mg, 0.25 mg, subcutaneous, Once PRN, Loyda Fonseca MD    tranexamic acid (Cyklokapron) injection 1,000 mg, 1,000 mg, intravenous, Once PRN, Loyda Fonseca MD  Prior to Admission medications    Medication Sig Start Date End Date Taking? Authorizing Provider   alcohol swabs (Alcohol Prep Pads) pads, medicated Apply 1 Product topically 4 times a day. 1/22/24 1/21/25  BLAISE Bah   blood sugar diagnostic (OneTouch Ultra Test) strip Use 1 strip 4-6 times per day during pregnancy 1/30/24   Randy Simon MD   blood-glucose meter (OneTouch Verio Flex meter) misc Use for testing blood sugar 4-6 times per day during pregnancy 1/30/24   Randy Simon MD   ferrous gluconate (Fergon) 240 (27 Fe) MG tablet Take 2 tablets (480 mg) by mouth every other day. One pill twice a day every other day 2/12/24 2/11/25  BLAISE Bah   ferrous sulfate, 325 mg ferrous sulfate, tablet Take 1 tablet by mouth once daily with a meal. 11/29/23 11/28/24  BLAISE Bah   lancets misc Test blood sugar fasting in the morning, then 1hr after each meal (4 times per day) 1/22/24   BLAISE Bah   lancets misc Use 1 lancet 4-6 times per day during pregnancy 1/30/24   Randy Simon MD   lancets 33 gauge misc Use 1 lancet 4-6 times per day during pregnancy 1/30/24   Randy Simon MD   prenatal vitamin, iron-folic, 27 mg iron-800 mcg folic acid tablet Take 1 tablet by mouth once daily. 11/27/23 11/26/24  BLAISE Bah   ferrous gluconate (Fergon) 240 (27 Fe) MG tablet Take 2 tablets (480 mg) by mouth every other day. One pill twice a day every other day 1/5/24 2/12/24   Padma Forbes, APRN-RAHM     No Known Allergies  Social History     Tobacco Use    Smoking status: Never    Smokeless tobacco: Current     Types: Chew   Substance Use Topics    Alcohol use: Never         Chemistry    Lab Results   Component Value Date/Time     05/29/2020 0444    K 3.6 05/29/2020 0444     05/29/2020 0444    CO2 27 05/29/2020 0444    BUN 4 (L) 05/29/2020 0444    CREATININE 0.53 05/29/2020 0444    Lab Results   Component Value Date/Time    CALCIUM 9.9 05/29/2020 0444    ALKPHOS 58 12/11/2019 1557    AST 17 12/11/2019 1557    ALT 21 12/11/2019 1557    BILITOT 0.5 12/11/2019 1557          Lab Results   Component Value Date/Time    WBC 8.4 02/18/2024 0948    HGB 12.0 02/18/2024 0948    HCT 35.5 (L) 02/18/2024 0948     02/18/2024 0948     Lab Results   Component Value Date/Time    PROTIME 14.3 (H) 12/02/2018 2320    INR 1.3 (H) 12/02/2018 2320     No results found for this or any previous visit (from the past 4464 hour(s)).     Physical Exam    Airway  Mallampati: III  Neck ROM: full     Cardiovascular - normal exam     Dental    Pulmonary - normal exam     Abdominal            Anesthesia Plan    History of general anesthesia?: yes  History of complications of general anesthesia?: no    ASA 3     epidural     The patient is not a current smoker.    Postoperative administration of opioids is intended.  Anesthetic plan and risks discussed with patient (Martti interpretor used).  Use of blood products discussed with patient who consented to blood products.    Plan discussed with resident.

## 2024-02-18 NOTE — H&P
Obstetrical Admission History and Physical    HPI   Vale Mendoza is a 34 y.o.  at 39w1d, sammie 2024, by Ultrasound who presents for IOL.    Pregnancy notable for:   --Hungarian speaking only. Needs .  --GDMA1, well-controlled. Efw 22% on 24. Efw today 7-7.5#  --AMA  --TOLAC, (vag, c/s breech) mfmu 73% when signed in office  --history latent tuberculosis- diagnosed  (+PPD, neg CXR), hx of exposure in childhood, s/p   ID consult -plan was for PP tx but pt did not follow up. Per Dr. Quinn will have ID see   pt after delivery  --GBS negative    Obstetrical History   OB History    Para Term  AB Living   3 2 2 0 0 2   SAB IAB Ectopic Multiple Live Births   0 0 0 0 2      # Outcome Date GA Lbr Xavi/2nd Weight Sex Delivery Anes PTL Lv   3 Current            2 Term 16 37w0d  3.6 kg M CS-LTranv EPI  ALEXANDER      Complications: Breech birth, Postpartum hemorrhage   1 Term  40w0d  2.977 kg M Vag-Spont   ALEXANDER     Past Medical History  Past Medical History:   Diagnosis Date    Abnormal Pap smear of cervix     AMA (advanced maternal age) multigravida 35+     Latent tuberculosis     Migraine         Past Surgical History   Past Surgical History:   Procedure Laterality Date     SECTION, LOW TRANSVERSE      OOPHORECTOMY       Social History  Social History     Tobacco Use    Smoking status: Never    Smokeless tobacco: Current     Types: Chew   Substance Use Topics    Alcohol use: Never     Substance and Sexual Activity   Drug Use Never     Allergies  Patient has no known allergies.     Medications  Medications Prior to Admission   Medication Sig Dispense Refill Last Dose    alcohol swabs (Alcohol Prep Pads) pads, medicated Apply 1 Product topically 4 times a day. 100 each 3     blood sugar diagnostic (OneTouch Ultra Test) strip Use 1 strip 4-6 times per day during pregnancy 150 each 3     blood-glucose meter (OneTouch Verio Flex meter) misc Use for testing blood sugar 4-6 times  per day during pregnancy 1 each 0     ferrous gluconate (Fergon) 240 (27 Fe) MG tablet Take 2 tablets (480 mg) by mouth every other day. One pill twice a day every other day 90 tablet 3 2/17/2024    ferrous sulfate, 325 mg ferrous sulfate, tablet Take 1 tablet by mouth once daily with a meal. 30 tablet 11     lancets misc Test blood sugar fasting in the morning, then 1hr after each meal (4 times per day) 100 each 3     lancets misc Use 1 lancet 4-6 times per day during pregnancy 200 each 3     lancets 33 gauge misc Use 1 lancet 4-6 times per day during pregnancy 200 each 3     prenatal vitamin, iron-folic, 27 mg iron-800 mcg folic acid tablet Take 1 tablet by mouth once daily. 90 tablet 3 2/17/2024     Review of Systems  Please see HPI for reported pertinent positives, which would supersede this ROS    Constitutional:  Denies fever, chills, wt gain or loss, fatigue  Genito-Urinary:  Denies genital lesion or sores, vaginal dryness, itching  or pain.  No abnormal vaginal discharge or unexplained vaginal bleeding.  No dysuria, urinary incontinence or frequency.  Denies pelvic pain, dysmenorrhea or dyspareunia.  Eyes:  Denies vision changes, dryness  ENT:  No hearing loss, sinus pain or congestion, nosebleeds  Cardiovascular:  No chest pain or palpitations  Respiratory:  No SOB, cough, wheezing  GI:  No Nausea, vomiting, diarrhea, constipation, abdominal pain  Musculoskeletal:  No new back pain. joint pain, peripheral edema  Skin:  No rash or skin lesion  Neurologic:  No HA, numbness or dizziness  Psychiatric:  No new anxiety or depression  Endocrine:  No loss of hair or hirsutism  Hematologic/lymphatic:  No swollen lymph nodes.  No reported tendency for easy bruising or bleeding    Patient admits to no other systemic complaints    Objective    Last Vitals  Temp Pulse Resp BP MAP O2 Sat   36.8 °C (98.2 °F) 105 16 119/80   96 %     Physical Examination  Physical Exam  Genitourinary:      Genitourinary Comments:  1-2/60/-3/mod/mid  Cephalic per ve (sutures)   Cardiovascular:      Rate and Rhythm: Normal rate.   Pulmonary:      Effort: Pulmonary effort is normal.   Abdominal:      Comments: gravid   Neurological:      Mental Status: She is alert and oriented to person, place, and time.   Skin:     General: Skin is warm and dry.   Psychiatric:         Mood and Affect: Mood normal.         Behavior: Behavior normal.         Thought Content: Thought content normal.         Judgment: Judgment normal.     , mod brown, +accels, no decels  [place]:  infrequent    Lab Review  Lab Results   Component Value Date    WBC 8.4 2024    HGB 12.0 2024    HCT 35.5 (L) 2024     2024     Assessment   at 39w1d  Sami speakng  IOL, harris score 5 in this multip  Fht category 1  GDMA1  TOLAC, mfmu 73%  Latent tuberculosis  Gbs negative    Plan  Admit. Ob labs. Cross per protocol  IOL via crb/pit--->arom  Pain meds per pt request  Cefm  Per Dr. Salinas, will order ID consult after delivery  Anticipate   Dr. Quinn aware of admission, status and plan.  All consents and conversations with patient were discussed with a DEWEY interpretor.

## 2024-02-18 NOTE — H&P
Obstetrical Admission History and Physical    HPI   Vale Mendoza is a 34 y.o.  at 39w1d, sammie 2024, by Ultrasound who presents for IOL due to GDMA1. Pregnancy notable for:  --speaks Polish. Needs interpretor  --GDMA1, stable, efw 22% 1 month ago  --TOLAC (vag, c/s breech), mfmu score 73% in office  --ama  --latent tuberculosis. No tx or evaluation since 2016    Obstetrical History   OB History    Para Term  AB Living   3 2 2 0 0 2   SAB IAB Ectopic Multiple Live Births   0 0 0 0 2      # Outcome Date GA Lbr Xavi/2nd Weight Sex Delivery Anes PTL Lv   3 Current            2 Term 16 37w0d  3.6 kg M CS-LTranv EPI  ALEXANDER      Complications: Breech birth, Postpartum hemorrhage   1 Term  40w0d  2.977 kg M Vag-Spont   ALEXANDER     Past Medical History  Past Medical History:   Diagnosis Date    Abnormal Pap smear of cervix     AMA (advanced maternal age) multigravida 35+     Latent tuberculosis     Migraine      Past Surgical History   Past Surgical History:   Procedure Laterality Date     SECTION, LOW TRANSVERSE      OOPHORECTOMY       Social History  Social History     Tobacco Use    Smoking status: Never    Smokeless tobacco: Current     Types: Chew   Substance Use Topics    Alcohol use: Never     Substance and Sexual Activity   Drug Use Never       Allergies  Patient has no known allergies.     Medications  Medications Prior to Admission   Medication Sig Dispense Refill Last Dose    alcohol swabs (Alcohol Prep Pads) pads, medicated Apply 1 Product topically 4 times a day. 100 each 3     blood sugar diagnostic (OneTouch Ultra Test) strip Use 1 strip 4-6 times per day during pregnancy 150 each 3     blood-glucose meter (OneTouch Verio Flex meter) misc Use for testing blood sugar 4-6 times per day during pregnancy 1 each 0     ferrous gluconate (Fergon) 240 (27 Fe) MG tablet Take 2 tablets (480 mg) by mouth every other day. One pill twice a day every other day 90 tablet 3 2024     ferrous sulfate, 325 mg ferrous sulfate, tablet Take 1 tablet by mouth once daily with a meal. 30 tablet 11     lancets misc Test blood sugar fasting in the morning, then 1hr after each meal (4 times per day) 100 each 3     lancets misc Use 1 lancet 4-6 times per day during pregnancy 200 each 3     lancets 33 gauge misc Use 1 lancet 4-6 times per day during pregnancy 200 each 3     prenatal vitamin, iron-folic, 27 mg iron-800 mcg folic acid tablet Take 1 tablet by mouth once daily. 90 tablet 3 2024       Review of Systems  Please see HPI for reported pertinent positives, which would supersede this ROS    Constitutional:  Denies fever, chills, wt gain or loss, fatigue  Genito-Urinary:  Denies genital lesion or sores, vaginal dryness, itching  or pain.  No abnormal vaginal discharge or unexplained vaginal bleeding.  No dysuria, urinary incontinence or frequency.  Denies pelvic pain, dysmenorrhea or dyspareunia.  Eyes:  Denies vision changes, dryness  ENT:  No hearing loss, sinus pain or congestion, nosebleeds  Cardiovascular:  No chest pain or palpitations  Respiratory:  No SOB, cough, wheezing  GI:  No Nausea, vomiting, diarrhea, constipation, abdominal pain  Musculoskeletal:  No new back pain. joint pain, peripheral edema  Skin:  No rash or skin lesion  Neurologic:  No HA, numbness or dizziness  Psychiatric:  No new anxiety or depression  Endocrine:  No loss of hair or hirsutism  Hematologic/lymphatic:  No swollen lymph nodes.  No reported tendency for easy bruising or bleeding    Patient admits to no other systemic complaints    Objective    Last Vitals  Temp Pulse Resp BP MAP O2 Sat   36.8 °C (98.2 °F) 105 16 119/80   96 %     Physical Examination  OBGyn Exam   FHT   [place]:    Assessment   at 39w1d    Plan  Admit. Ob labs.      aware of admission, status and plan

## 2024-02-18 NOTE — PROGRESS NOTES
Intrapartum Progress Note    Subjective   Conversation with pt via lora . Pt very uncomfortable and asking for pain meds. She says she is feeling some urge to push. Is amenable to calling anesthesia for epidural.    Objective   Last Vitals:  Temp Pulse Resp BP MAP Pulse Ox   36.8 °C (98.2 °F) 95 18 118/76   97 %     Physical Examination:  , mod brown, +accels, no decels  [place]: q 3-4, coupling  VE: //-3    Assessment   Vale Mendoza is a 34 y.o.  at 39w1d  IOL, latent phase  Fht cat 1  GDMA1, stable  tolac    Plan   Continue pit per protocol  Cefm  Call anesthesia for epidural   After comfortable suggest creating a word or signal to let staff know that pt wants to tell us or ask us something so we can call .

## 2024-02-18 NOTE — PROGRESS NOTES
Intrapartum Progress Note    Subjective   Tolerating contractions    Objective   Last Vitals:  Temp Pulse Resp BP MAP Pulse Ox   36.8 °C (98.2 °F) 86 16 123/83   97 %     Physical Examination:  , mod brown, +accels, no decels  [place]: q 3-4, pit @8mu/min  VE: def'd    Assessment   Vale Mendoza is a 34 y.o.  at 39w1d  IOL, latent phase  Tolac  Fht cat 1  Gbs neg    Plan   Continue current course  Anticipate

## 2024-02-18 NOTE — SIGNIFICANT EVENT
Pt romero frequently. Not quite tachysystole but having less resting tone. Will decrease pitocin by 2mu/min (to 6 from 8) and reassess.  Floernce, estefanía     2802 addendum:  Pt amenable to ve and possible arom  Ve: 4/70/-3 with head better applied  Arom clear    Continue current course

## 2024-02-19 ENCOUNTER — APPOINTMENT (OUTPATIENT)
Dept: RADIOLOGY | Facility: HOSPITAL | Age: 34
End: 2024-02-19
Payer: COMMERCIAL

## 2024-02-19 LAB
ALBUMIN SERPL BCP-MCNC: 3.1 G/DL (ref 3.4–5)
ALP SERPL-CCNC: 86 U/L (ref 33–110)
ALT SERPL W P-5'-P-CCNC: 14 U/L (ref 7–45)
ANION GAP SERPL CALC-SCNC: 11 MMOL/L (ref 10–20)
AST SERPL W P-5'-P-CCNC: 22 U/L (ref 9–39)
BILIRUB SERPL-MCNC: 0.4 MG/DL (ref 0–1.2)
BUN SERPL-MCNC: 9 MG/DL (ref 6–23)
CALCIUM SERPL-MCNC: 9.1 MG/DL (ref 8.6–10.6)
CHLORIDE SERPL-SCNC: 108 MMOL/L (ref 98–107)
CO2 SERPL-SCNC: 23 MMOL/L (ref 21–32)
CREAT SERPL-MCNC: 0.82 MG/DL (ref 0.5–1.05)
CRP SERPL-MCNC: 7.24 MG/DL
EGFRCR SERPLBLD CKD-EPI 2021: >90 ML/MIN/1.73M*2
ERYTHROCYTE [SEDIMENTATION RATE] IN BLOOD BY WESTERGREN METHOD: 8 MM/H (ref 0–20)
GLUCOSE BLD MANUAL STRIP-MCNC: 95 MG/DL (ref 74–99)
GLUCOSE SERPL-MCNC: 95 MG/DL (ref 74–99)
POTASSIUM SERPL-SCNC: 3.6 MMOL/L (ref 3.5–5.3)
PROT SERPL-MCNC: 5.3 G/DL (ref 6.4–8.2)
SODIUM SERPL-SCNC: 138 MMOL/L (ref 136–145)

## 2024-02-19 PROCEDURE — 99222 1ST HOSP IP/OBS MODERATE 55: CPT | Performed by: INTERNAL MEDICINE

## 2024-02-19 PROCEDURE — 59409 OBSTETRICAL CARE: CPT

## 2024-02-19 PROCEDURE — 88307 TISSUE EXAM BY PATHOLOGIST: CPT | Performed by: STUDENT IN AN ORGANIZED HEALTH CARE EDUCATION/TRAINING PROGRAM

## 2024-02-19 PROCEDURE — 7100000016 HC LABOR RECOVERY PER HOUR

## 2024-02-19 PROCEDURE — 86140 C-REACTIVE PROTEIN: CPT | Performed by: NURSE PRACTITIONER

## 2024-02-19 PROCEDURE — 2500000005 HC RX 250 GENERAL PHARMACY W/O HCPCS

## 2024-02-19 PROCEDURE — 71046 X-RAY EXAM CHEST 2 VIEWS: CPT | Performed by: RADIOLOGY

## 2024-02-19 PROCEDURE — 36415 COLL VENOUS BLD VENIPUNCTURE: CPT | Performed by: NURSE PRACTITIONER

## 2024-02-19 PROCEDURE — 0UQMXZZ REPAIR VULVA, EXTERNAL APPROACH: ICD-10-PCS

## 2024-02-19 PROCEDURE — 82947 ASSAY GLUCOSE BLOOD QUANT: CPT

## 2024-02-19 PROCEDURE — 85652 RBC SED RATE AUTOMATED: CPT | Performed by: NURSE PRACTITIONER

## 2024-02-19 PROCEDURE — 0HQ9XZZ REPAIR PERINEUM SKIN, EXTERNAL APPROACH: ICD-10-PCS

## 2024-02-19 PROCEDURE — 2500000004 HC RX 250 GENERAL PHARMACY W/ HCPCS (ALT 636 FOR OP/ED)

## 2024-02-19 PROCEDURE — 88307 TISSUE EXAM BY PATHOLOGIST: CPT | Mod: TC,SUR

## 2024-02-19 PROCEDURE — 1120000001 HC OB PRIVATE ROOM DAILY

## 2024-02-19 PROCEDURE — 2500000001 HC RX 250 WO HCPCS SELF ADMINISTERED DRUGS (ALT 637 FOR MEDICARE OP)

## 2024-02-19 PROCEDURE — 7210000002 HC LABOR PER HOUR

## 2024-02-19 PROCEDURE — 51701 INSERT BLADDER CATHETER: CPT

## 2024-02-19 PROCEDURE — 80053 COMPREHEN METABOLIC PANEL: CPT | Performed by: NURSE PRACTITIONER

## 2024-02-19 PROCEDURE — 86481 TB AG RESPONSE T-CELL SUSP: CPT | Performed by: NURSE PRACTITIONER

## 2024-02-19 PROCEDURE — 71046 X-RAY EXAM CHEST 2 VIEWS: CPT

## 2024-02-19 PROCEDURE — 1100000001 HC PRIVATE ROOM DAILY

## 2024-02-19 RX ORDER — LOPERAMIDE HYDROCHLORIDE 2 MG/1
4 CAPSULE ORAL EVERY 2 HOUR PRN
Status: DISCONTINUED | OUTPATIENT
Start: 2024-02-19 | End: 2024-02-21 | Stop reason: HOSPADM

## 2024-02-19 RX ORDER — ACETAMINOPHEN 325 MG/1
975 TABLET ORAL EVERY 6 HOURS
Status: DISCONTINUED | OUTPATIENT
Start: 2024-02-19 | End: 2024-02-21 | Stop reason: HOSPADM

## 2024-02-19 RX ORDER — MISOPROSTOL 200 UG/1
800 TABLET ORAL ONCE AS NEEDED
Status: DISCONTINUED | OUTPATIENT
Start: 2024-02-19 | End: 2024-02-21 | Stop reason: HOSPADM

## 2024-02-19 RX ORDER — LABETALOL HYDROCHLORIDE 5 MG/ML
20 INJECTION, SOLUTION INTRAVENOUS ONCE AS NEEDED
Status: DISCONTINUED | OUTPATIENT
Start: 2024-02-19 | End: 2024-02-21 | Stop reason: HOSPADM

## 2024-02-19 RX ORDER — METHYLERGONOVINE MALEATE 0.2 MG/ML
0.2 INJECTION INTRAVENOUS ONCE AS NEEDED
Status: DISCONTINUED | OUTPATIENT
Start: 2024-02-19 | End: 2024-02-21 | Stop reason: HOSPADM

## 2024-02-19 RX ORDER — TRANEXAMIC ACID 100 MG/ML
1000 INJECTION, SOLUTION INTRAVENOUS ONCE AS NEEDED
Status: DISCONTINUED | OUTPATIENT
Start: 2024-02-19 | End: 2024-02-21 | Stop reason: HOSPADM

## 2024-02-19 RX ORDER — DIPHENHYDRAMINE HCL 25 MG
25 CAPSULE ORAL EVERY 6 HOURS PRN
Status: DISCONTINUED | OUTPATIENT
Start: 2024-02-19 | End: 2024-02-21 | Stop reason: HOSPADM

## 2024-02-19 RX ORDER — OXYTOCIN/0.9 % SODIUM CHLORIDE 30/500 ML
60 PLASTIC BAG, INJECTION (ML) INTRAVENOUS ONCE AS NEEDED
Status: DISCONTINUED | OUTPATIENT
Start: 2024-02-19 | End: 2024-02-21 | Stop reason: HOSPADM

## 2024-02-19 RX ORDER — ENOXAPARIN SODIUM 100 MG/ML
40 INJECTION SUBCUTANEOUS EVERY 24 HOURS
Status: DISCONTINUED | OUTPATIENT
Start: 2024-02-19 | End: 2024-02-21 | Stop reason: HOSPADM

## 2024-02-19 RX ORDER — OXYTOCIN 10 [USP'U]/ML
10 INJECTION, SOLUTION INTRAMUSCULAR; INTRAVENOUS ONCE AS NEEDED
Status: DISCONTINUED | OUTPATIENT
Start: 2024-02-19 | End: 2024-02-21 | Stop reason: HOSPADM

## 2024-02-19 RX ORDER — LIDOCAINE 560 MG/1
1 PATCH PERCUTANEOUS; TOPICAL; TRANSDERMAL
Status: DISCONTINUED | OUTPATIENT
Start: 2024-02-19 | End: 2024-02-21 | Stop reason: HOSPADM

## 2024-02-19 RX ORDER — ADHESIVE BANDAGE
10 BANDAGE TOPICAL
Status: DISCONTINUED | OUTPATIENT
Start: 2024-02-19 | End: 2024-02-21 | Stop reason: HOSPADM

## 2024-02-19 RX ORDER — NIFEDIPINE 10 MG/1
10 CAPSULE ORAL ONCE AS NEEDED
Status: DISCONTINUED | OUTPATIENT
Start: 2024-02-19 | End: 2024-02-21 | Stop reason: HOSPADM

## 2024-02-19 RX ORDER — CARBOPROST TROMETHAMINE 250 UG/ML
250 INJECTION, SOLUTION INTRAMUSCULAR ONCE AS NEEDED
Status: DISCONTINUED | OUTPATIENT
Start: 2024-02-19 | End: 2024-02-21 | Stop reason: HOSPADM

## 2024-02-19 RX ORDER — BISACODYL 10 MG/1
10 SUPPOSITORY RECTAL DAILY PRN
Status: DISCONTINUED | OUTPATIENT
Start: 2024-02-19 | End: 2024-02-21 | Stop reason: HOSPADM

## 2024-02-19 RX ORDER — DIPHENHYDRAMINE HYDROCHLORIDE 50 MG/ML
25 INJECTION INTRAMUSCULAR; INTRAVENOUS EVERY 6 HOURS PRN
Status: DISCONTINUED | OUTPATIENT
Start: 2024-02-19 | End: 2024-02-21 | Stop reason: HOSPADM

## 2024-02-19 RX ORDER — ONDANSETRON HYDROCHLORIDE 2 MG/ML
4 INJECTION, SOLUTION INTRAVENOUS EVERY 6 HOURS PRN
Status: DISCONTINUED | OUTPATIENT
Start: 2024-02-19 | End: 2024-02-21 | Stop reason: HOSPADM

## 2024-02-19 RX ORDER — ONDANSETRON 4 MG/1
4 TABLET, FILM COATED ORAL EVERY 6 HOURS PRN
Status: DISCONTINUED | OUTPATIENT
Start: 2024-02-19 | End: 2024-02-21 | Stop reason: HOSPADM

## 2024-02-19 RX ORDER — FAMOTIDINE 20 MG/1
20 TABLET, FILM COATED ORAL 2 TIMES DAILY
Status: DISCONTINUED | OUTPATIENT
Start: 2024-02-19 | End: 2024-02-21 | Stop reason: HOSPADM

## 2024-02-19 RX ORDER — SIMETHICONE 80 MG
80 TABLET,CHEWABLE ORAL 4 TIMES DAILY PRN
Status: DISCONTINUED | OUTPATIENT
Start: 2024-02-19 | End: 2024-02-21 | Stop reason: HOSPADM

## 2024-02-19 RX ORDER — IBUPROFEN 600 MG/1
600 TABLET ORAL EVERY 6 HOURS
Status: DISCONTINUED | OUTPATIENT
Start: 2024-02-19 | End: 2024-02-19

## 2024-02-19 RX ORDER — POLYETHYLENE GLYCOL 3350 17 G/17G
17 POWDER, FOR SOLUTION ORAL 2 TIMES DAILY PRN
Status: DISCONTINUED | OUTPATIENT
Start: 2024-02-19 | End: 2024-02-21 | Stop reason: HOSPADM

## 2024-02-19 RX ORDER — CALCIUM CARBONATE 200(500)MG
500 TABLET,CHEWABLE ORAL DAILY
Status: DISCONTINUED | OUTPATIENT
Start: 2024-02-19 | End: 2024-02-21 | Stop reason: HOSPADM

## 2024-02-19 RX ORDER — HYDRALAZINE HYDROCHLORIDE 20 MG/ML
5 INJECTION INTRAMUSCULAR; INTRAVENOUS ONCE AS NEEDED
Status: DISCONTINUED | OUTPATIENT
Start: 2024-02-19 | End: 2024-02-21 | Stop reason: HOSPADM

## 2024-02-19 RX ADMIN — ACETAMINOPHEN 975 MG: 325 TABLET ORAL at 23:31

## 2024-02-19 RX ADMIN — IBUPROFEN 600 MG: 600 TABLET, FILM COATED ORAL at 11:49

## 2024-02-19 RX ADMIN — ACETAMINOPHEN 975 MG: 325 TABLET ORAL at 11:48

## 2024-02-19 RX ADMIN — ACETAMINOPHEN 975 MG: 325 TABLET ORAL at 05:48

## 2024-02-19 RX ADMIN — CALCIUM CARBONATE (ANTACID) CHEW TAB 500 MG 500 MG: 500 CHEW TAB at 17:44

## 2024-02-19 RX ADMIN — ACETAMINOPHEN 975 MG: 325 TABLET ORAL at 17:44

## 2024-02-19 RX ADMIN — FAMOTIDINE 20 MG: 20 TABLET, FILM COATED ORAL at 22:24

## 2024-02-19 RX ADMIN — SODIUM CHLORIDE, POTASSIUM CHLORIDE, SODIUM LACTATE AND CALCIUM CHLORIDE 125 ML/HR: 600; 310; 30; 20 INJECTION, SOLUTION INTRAVENOUS at 04:30

## 2024-02-19 RX ADMIN — DIPHENHYDRAMINE HYDROCHLORIDE AND LIDOCAINE HYDROCHLORIDE AND ALUMINUM HYDROXIDE AND MAGNESIUM HYDRO 10 ML: KIT at 17:44

## 2024-02-19 RX ADMIN — ENOXAPARIN SODIUM 40 MG: 100 INJECTION SUBCUTANEOUS at 17:47

## 2024-02-19 ASSESSMENT — PAIN SCALES - GENERAL
PAINLEVEL_OUTOF10: 0 - NO PAIN
PAINLEVEL_OUTOF10: 4
PAINLEVEL_OUTOF10: 0 - NO PAIN
PAINLEVEL_OUTOF10: 0 - NO PAIN
PAINLEVEL_OUTOF10: 10 - WORST POSSIBLE PAIN
PAINLEVEL_OUTOF10: 0 - NO PAIN
PAINLEVEL_OUTOF10: 6

## 2024-02-19 ASSESSMENT — PAIN - FUNCTIONAL ASSESSMENT: PAIN_FUNCTIONAL_ASSESSMENT: 0-10

## 2024-02-19 ASSESSMENT — PAIN DESCRIPTION - DESCRIPTORS: DESCRIPTORS: BURNING

## 2024-02-19 NOTE — CARE PLAN
Patient is progressing through goals. Lochia is light, pain is well controlled on PO meds, and vital signs are stable.

## 2024-02-19 NOTE — PROGRESS NOTES
Assessment    34 y.o.  at 39w2d  FHT Category 1  Active labor  TOLAC   AMA   GDMA 1  GBS neg     Plan    Encourage frequent position changes as tolerated  Maternal repositioning  Start/Continue pitocin per protocol  Pain management per patient request  Continue assessment of maternal and fetal wellbeing  Recheck as clinically indicated by maternal or fetal status  Anticipate second stage of labor  Anticipate NSVB    Inga Reynai, APRN-CNM    Subjective:  Vale Mendoza is resting in bed, comfortable with epidural. Coping well with contractions. Does not feel any pressure but having early decels on the monitor.       Objective:  Fetal Monitoring      Baseline FHR: 135 per minute  Variability: moderate  Accelerations: yes  Decelerations: none  TOCO: regular, every 2 minutes    Cervical Exam:  9 cm dilated, 90 effaced, 0 station    Membrane status: ruptured, clear fluid    Pitocin is at 6 mu/min.    Vitals:    24 0133 24 0138 24 0143 24 0148   BP:  121/81     Pulse: 106 102 102 99   Resp:       Temp:       TempSrc:       SpO2: 97% 98% 98% 98%   Weight:       Height:

## 2024-02-19 NOTE — LACTATION NOTE
Lactation Consultant Note  Lactation Consultation  Reason for Consult: Initial assessment, Other (Comment) (mom wants to pump)  Consultant Name: Mariana Juarez, RN, IBCLC    Maternal Information  Has mother  before?: No (attempted)  Infant to breast within first 2 hours of birth?: No  Breastfeeding Delayed Due to: Other (Comment) (mom wants to supplement with formula until she can bring her milk in by pumping)  Exclusive Pump and Bottle Feed: Yes    Maternal Assessment  Breast Assessment: Small, Symmetrical, Compressible, Other (Comment) (minimally expressible - but, baby 8 hours old at time of visit)  Nipple Assessment: Intact, Erect  [place] Assessment: Normal    Infant Assessment       Feeding Assessment  Unable to assess infant feeding at this time: Maternal request (wants to exclusively pump)    LATCH TOOL       Breast Pump  Pump: Hospital grade electric pump, Hand expression, Double breast pumping, Massage  Frequency: 8-10 times per day  Duration: Initiate phase  Breast Shield Size and Type: 21 mm, Other (comment) (measured mom- around 17.5 MM- advised to use 21 MM)    Other OB Lactation Tools  Lactation Tools: Flanges    Patient Follow-up  Inpatient Lactation Follow-up Needed : Yes  Outpatient Lactation Follow-up: Recommended    Other OB Lactation Documentation  Maternal Risk Factors: Other (comment), Diabetes (gestational, types 1 or 2) (previous poor breastfeeding outcomes)  Infant Risk Factors: Early term birth 37-39 weeks    Recommendations/Summary  Per RN- Peds stated patient would like to pump.     Mom speaks [place]- used [place] translation to speak with patient and review pump usage.     Reviewed the difference between latching and pumping, the benefit of skin to skin, the benefits of breast massage prior to pumping, expectations of volume with pumping, milk storage and cleaning of pump parts.   PI-123 and [facility] pump cleaning guide given.     Oriented mom to pump set up- use- and cleaning  of pump parts.     Encouraged her to pump every 3 hours (8-12 times in a 24 hour period) for 20 minutes on both breasts and to give the baby any pumped breast milk prior to any use of supplement.      Her  speaks some English- reviewed with him outpatient lactation resources, if needed and encouraged him to go with her for interpretation needs.   Encouraged her to utilize the outpatient lactation resources, if needed.   Contact information given.   [phone] [place] or [phone] [place]      I will order her a breast pump through insurance Mom has [place] - will fax to [employer].     Printed out information about Breastfeeding, pumping, milk storage, car seat safety, Childhood vaccinations, warning sign checklist, and basic  of infant in [place] language/ english language off of [employer] website.   Bedside RN printed out translation paperwork to give to patient the next time she enters her room.

## 2024-02-19 NOTE — PROGRESS NOTES
Assessment    34 y.o.  at 39w1d  FHT Category 1  Latent labor  TOLAC   AMA   GDMA 1  GBS neg     Plan      Encourage frequent position changes as tolerated  Maternal repositioning  Start/Continue pitocin per protocol  Pain management per patient request  Continue assessment of maternal and fetal wellbeing  Recheck as clinically indicated by maternal or fetal status  Anticipate active phase of labor  Anticipate second stage of labor  Anticipate NSVB    Inga R Vagi, APRN-CNM    Subjective:  Vale Mendoza is resting in bed, comfortable with epidural. No concerns or complaints at this time     Objective:  Fetal Monitoring      Baseline FHR: 140 per minute  Variability: moderate  Accelerations: yes  Decelerations: none  TOCO: regular, every 2 minutes    Cervical Exam:  5 cm dilated, 80 effaced, -3 station    Membrane status: ruptured, clear fluid    Pitocin is at 6 mu/min.    Vitals:    24 2208 24 2213 24 2218 24 2223   BP:    127/85   Pulse: 101 100 94 94   Resp:       Temp:       TempSrc:       SpO2: 98% 99% 99%    Weight:       Height:

## 2024-02-19 NOTE — ANESTHESIA PROCEDURE NOTES
Epidural Block    Patient location during procedure: OB  Start time: 2/18/2024 7:00 PM  Reason for block: labor analgesia  Staffing  Performed: resident   Authorized by: Fam Walsh DO    Performed by: Fam Walsh DO    Preanesthetic Checklist  Completed: patient identified, IV checked, risks and benefits discussed, surgical consent, pre-op evaluation, timeout performed and sterile techniques followed  Block Timeout  RN/Licensed healthcare professional reads aloud to the Anesthesia provider and entire team: Patient identity, procedure with side and site, patient position, and as applicable the availability of implants/special equipment/special requirements.  Patient on coagulant treatment: no  Timeout performed at: 2/18/2024 7:02 PM  Block Placement  Patient position: sitting  Prep: ChloraPrep  Sterility prep: drape, gloves, hand, mask and cap  Sedation level: no sedation  Patient monitoring: blood pressure, continuous pulse oximetry and heart rate  Approach: midline  Local numbing: lidocaine 1% to skin and subcutaneous tissues  Vertebral space: lumbar  Lumbar location: L3-L4  Epidural  Loss of resistance technique: saline  Guidance: landmark technique        Needle  Needle type: Tuohy   Needle gauge: 17  Needle length: 10 cm  Needle insertion depth: 6 cm  Catheter type: multi-orifice  Catheter size: 19 G  Catheter at skin depth: 11 cm  Catheter securement method: clear occlusive dressing    Test dose: lidocaine 1.5% with epinephrine 1-to-200,000  Test dose: lidocaine 1.5% with epinephrine 1-to-200,000  Test dose result: no positive test dose    PCEA  Medication concentration used: 0.044% Bupivacaine with 1.25 mcg/mL Fentanyl and 1:196070 Epinephrine  Dose (mL): 10  Lockout (minutes): 15  1-Hour Limit (boluses/hr): 4  Basal Rate: 14        Assessment  Sensory level: T10 bilateral  Number of attempts: 1  Events: no positive test dose  Procedure assessment: patient tolerated procedure well with no immediate  complications  Additional Notes  Gama  used throughout procedure.

## 2024-02-19 NOTE — DISCHARGE INSTRUCTIONS
.“The American Academy of Pediatrics recommends that pacifier use is best avoided during the initiation of breastfeeding and used only after breastfeeding is well established.  In some infants, early pacifier use may interfere with establishment of good breastfeeding practices, whereas in others it may indicate the presence of a breastfeeding problem that requires intervention.  Use of a pacifier may cause problems with latching, and lead to decreased milk supply by missing feeding opportunities.  Pacifiers may be used during painful procedures, but are not otherwise recommended while the infant is learning to breastfeed.”  .Post Birth Warning Signs  Any woman can have complications after the birth of a baby including a blood clot, a heart problem, hypertensive disorder/eclampsia, depression, hemorrhage, or infection. Notify all providers of your delivery date up to one year after birth.*       Call 911 or go to nearest emergency room right away if you have: PAIN or pressure in chest; OBSTRUCTED breathing or shortness of breath; SEIZURES; THOUGHTS of hurting yourself or your baby; heart palpitations/racing; change in alertness/confusion.    Call your provider if you have: BLEEDING, soaking through a pad/hour, or blood clots the size of an egg or bigger; INCISION (episiotomy stitches or  site) that is not healing (increased redness, pain, drainage/pus, or separation); RED or swollen leg/calf that is painful or warm to touch, especially in one leg more than the other; TEMPERATURE of 100.4 F or higher or chills; HEADACHE that does not get better with medicine, rest or hydration, or bad headache with vision changes like spots or flashing lights; increased swelling of face, hands or legs; severe cramps or upper right belly pain; red or swollen breast that is painful or warm to touch; an unusual, foul odor from your vaginal discharge; pain, burning, or difficulty during urination; severe constipation (more than 5  days); feelings of depression (such as depressed mood, loss of interest in enjoyable things, unable to care for yourself, trouble sleeping, lack of appetite, or feeling worthless).     If you can’t reach your provider or symptoms worsen, call 911 or go to nearest emergency room.   *Information obtained from [facility]s: Save Your Life: Get Care for These POST-BIRTH Warning Signs              Pacifier Use  “The American Academy of Pediatrics recommends that pacifier use is best avoided during the initiation of breastfeeding and used only after breastfeeding is well established.  In some infants, early pacifier use may interfere with establishment of good breastfeeding practices, whereas in others it may indicate the presence of a breastfeeding problem that requires intervention.  Use of a pacifier may cause problems with latching, and lead to decreased milk supply by missing feeding opportunities.  Pacifiers may be used during painful procedures, but are not otherwise recommended while the infant is learning to breastfeed.”        .Any woman can have complications after the birth of a baby including a blood clot, a heart problem, hypertensive disorder/eclampsia, depression, hemorrhage, or infection. Notify all providers of your delivery date up to one year after birth.*       Call 911 or go to nearest emergency room right away if you have: PAIN or pressure in chest; OBSTRUCTED breathing or shortness of breath; SEIZURES; THOUGHTS of hurting yourself or your baby; heart palpitations/racing; change in alertness/confusion.    Call your provider if you have: BLEEDING, soaking through a pad/hour, or blood clots the size of an egg or bigger; INCISION (episiotomy stitches or  site) that is not healing (increased redness, pain, drainage/pus, or separation); RED or swollen leg/calf that is painful or warm to touch, especially in one leg more than the other; TEMPERATURE of 100.4 F or higher or chills; HEADACHE that does  not get better with medicine, rest or hydration, or bad headache with vision changes like spots or flashing lights; increased swelling of face, hands or legs; severe cramps or upper right belly pain; red or swollen breast that is painful or warm to touch; an unusual, foul odor from your vaginal discharge; pain, burning, or difficulty during urination; severe constipation (more than 5 days); feelings of depression (such as depressed mood, loss of interest in enjoyable things, unable to care for yourself, trouble sleeping, lack of appetite, or feeling worthless).     If you can’t reach your provider or symptoms worsen, call 911 or go to nearest emergency room.   *Information obtained from [facility]: Save Your Life: Get Care for These POST-BIRTH Warning Signs

## 2024-02-19 NOTE — PROGRESS NOTES
Intrapartum Progress Note    Assessment   Vale Mendoza is a 34 y.o.  at 39w1d. BLAKE: 2024, by Ultrasound.   FHT Category 1  Latent labor  GBS neg   Resolved anemia   AMA   GDMA1  TOLAC     Plan   Encourage frequent position changes as tolerated  Maternal repositioning  Augmentation with pitocin per protocol  Pain management per patient request  Continue assessment of maternal and fetal wellbeing  Recheck as clinically indicated by maternal or fetal status  Anticipate active phase of labor  Anticipate second stage of labor  Anticipate NSVB      BLAISE Dewitt    Subjective   Met patient at this time and discussed plan for the shift. She is comfortable with epidural with support person at bedside. No concerns at this time. Pit infusing at 6mu/min.   POC testing has been stable     Pregnancy complicated by:  Pregnancy Problems (from 23 to present)       Problem Noted Resolved    Labor and delivery indication for care or intervention 2024 by Loyda Fonseca MD No    Priority:  Medium      Gestational diabetes mellitus (GDM) in third trimester 2024 by Randy Simon MD No    Priority:  Medium      Overview Signed 2024  4:03 PM by Randy Simon MD     -Diabetes RN education   -BG supplies given, RTC 1 week for BG review and delivery planning         Hx of  section 2024 by BLAISE Neville No    Priority:  Medium      Overview Addendum 2024  4:43 PM by BLAISE Neville     2016 LTCS for breech  Unsure if she would like to  or not          History of postpartum hemorrhage 2024 by BLAISE Neville No    Priority:  Medium      Overview Signed 2024  4:44 PM by BLAISE Neville     With 2016 pLTCS  S/p blood transfusion   Type and cross on admission          Multigravida of advanced maternal age in third trimester 2024 by BLAISE Neville No    Priority:  Medium       Overview Signed 1/5/2024  4:07 PM by BLAISE Neville     Not seen by genetics         Anemia during pregnancy in second trimester 11/29/2023 by BLAISE Bah No    Priority:  Medium      Overview Addendum 1/5/2024  4:03 PM by BLAISE Neville     Lab Results   Component Value Date    WBC 6.9 11/27/2023    HGB 9.9 (L) 11/27/2023    HCT 30.4 (L) 11/27/2023    MCV 96 11/27/2023     11/27/2023   Folate 4  Ferritin 20  Patient unsure if she's taking that at 32w; didn't bring medications with her; rx resent   Recheck CBC next visit           Supervision of other normal pregnancy, antepartum 10/23/2023 by Jessica Trevino No    Priority:  Medium      Overview Addendum 1/5/2024  4:06 PM by BLAISE Neville     Desired provider in labor: [] CNM  [] Physician  [x] Dated by:  22w US not c/w LMP   [] Initial BMI:   [x] Prenatal Labs: WNL  [x] Rh status: A+  [] Genetic Screening:    [x] Baby ASA: not on    [x] Anatomy US: WNl  [x] Fetal Sex: male, declines circ  [] Patient added to BirthTracks  [x] 1hr GCT at 24-28wks: 11/27/23 139  [] 3 hr GTT (if indicated):  [] Fetal Surveillance (if indicated):    [x] Tdap (27-36wks):  [x] Flu Shot: 1/5.24  [] COVID vaccine:     [] Breastfeeding:  [] Pain management during labor:   [] Postpartum Birth control method:   [] Labor Support:   [] GBS at 36 wks:               Principal Problem:    Labor and delivery indication for care or intervention  Active Problems:    Gastroesophageal reflux disease      Objective   Last Vitals:  Temp Pulse Resp BP MAP Pulse Ox   36.4 °C (97.5 °F) 91 18 114/65   98 %     Vitals Min/Max Last 24 Hours:  Temp  Min: 36.3 °C (97.3 °F)  Max: 36.8 °C (98.2 °F)  Pulse  Min: 71  Max: 117  Resp  Min: 16  Max: 18  BP  Min: 114/65  Max: 163/95    Intake/Output:  No intake or output data in the 24 hours ending 02/18/24 2059    Physical Examination:  GENERAL: Examination reveals a well developed, well  nourished, gravid female in no acute distress and whose affect is appropriate. She is alert and cooperative.  HEENT: PERRLA. External ears normal.  LUNGS:  normal resp effort   ABDOMEN: soft, gravid, nontender, nondistended, no abnormal masses, no epigastric pain, fundus soft, nontender, size equal dates, FHT present  FHR is  , with Accelerations, and a Category I tracing.    [place] reading:    The fetus is in a vertex presentation, determined by ultrasound and Leopold's maneuver  VAGINA: not evaluated  CERVIX: not evaluated;  EXTREMITIES: no redness or tenderness in the calves or thighs, no edema, no limitation in range of motion  SKIN: normal coloration and turgor, no rashes  NEUROLOGICAL: alert, oriented, normal speech, no focal findings or movement disorder noted  PSYCHOLOGICAL: awake and alert; oriented to person, place, and time    Fetal Monitoring      Baseline FHR: 140 per minute  Variability: moderate  Accelerations: yes  Decelerations: none  TOCO: regular, every 2 minutes    Lab Review:  Labs in chart were reviewed.  Lab Results   Component Value Date    ABO A 02/18/2024    LABRH POS 02/18/2024    ABSCRN NEG 02/18/2024     Lab Results   Component Value Date    WBC 8.4 02/18/2024    HGB 12.0 02/18/2024    HCT 35.5 (L) 02/18/2024     02/18/2024

## 2024-02-19 NOTE — L&D DELIVERY NOTE
OB Delivery Note  2024  Vale Mendoza  34 y.o.   Vaginal, Spontaneous      Pt pushing with fair effort. Head delivered, restituted to FARHAD. Shoulders delivered with gentle downward traction following restition. Nuchal cord noted x1, too tight to reduce, delivered through. Postpartum pitocin bolus initaited immediately after birth. Infant placed on maternal abdomin for skin-to-skin. Cord clamped and cut by this provider after 2 min delay. Placenta delivered with gentle downward traction. Fundus palpated firm, midline, and at umbilicus. Vagina, Perineum , and labia inspected. Periurethral abrasion hemostatic, but repaired  with 1 inturrupted stitch to approximate edges for healing. Repair made to 1st degree lac. EB: 200ml. Bladder emptied for 600cc urine with red rubber. Bleeding well controlled, pt stable. Mom and baby bonding well.     Gestational Age: 39w2d  /Para:   Quantitative Blood Loss: Admission to Discharge: 200 mL (2024  8:04 AM - 2024  5:23 AM)    Delia Mendoza [[id number]]      Labor Events    Sac identifier: Sac 1  Rupture date/time: 2024 1645  Rupture type: Artificial  Fluid color: Clear  Fluid odor: None  Labor type: Induced Onset of Labor  Labor allowed to proceed with plans for an attempted vaginal birth?: Yes  Induction: St/EASI  Induction date/time: 2024 1000  Induction indications: Diabetes  Complications: None       Labor Event Times    Labor onset date/time: 2024 0804  Dilation complete date/time: 2024 0226  Start pushing date/time: 2024 0230       Placenta    Placenta delivery date/time: 2024 0351  Placenta removal: Spontaneous  Placenta appearance: Intact  Placenta disposition: pathology       Cord    Vessels: 3 vessels  Complications: Nuchal  Nuchal intervention: reduced  Number of loops: 1  Delayed cord clamping?: Yes  Cord clamped date/time: 2024 0339  Cord blood disposition: Lab  Gases sent?: No  Stem cell collection (by  provider): No       Lacerations    Episiotomy: None  Perineal laceration: 1st  Perineal laceration repaired?: Yes  Periurethral laceration?: Yes  Periurethral laceration location: right  Periurethral laceration repaired?: Yes  Repair suture: 3-0 Synthetic Suture       Anesthesia    Method: Epidural       Operative Delivery    Forceps attempted?: No  Vacuum extractor attempted?: No       Shoulder Dystocia    Shoulder dystocia present?: No       [place] Delivery    Time head delivered: 2024 03:38:00  Birth date/time: 2024 03:38:00  Delivery type: Vaginal, Spontaneous  Complications: None       Resuscitation    Method: Suctioning, Tactile stimulation       Apgars    Living status: Living  Apgar Component Scores:  1 min.:  5 min.:  10 min.:  15 min.:  20 min.:    Skin color:  0  1       Heart rate:  2  2       Reflex irritability:  2  2       Muscle tone:  2  2       Respiratory effort:  2  2       Total:  8  9       Apgars assigned by: ANJALI ALVARES       Delivery Providers    Delivering clinician: BLAISE Dewitt   Provider Role    Baylee Louise RN Delivery Nurse    Anjali Alvraes, RN Nursery Nurse     Resident                 BLAISE Dewitt

## 2024-02-19 NOTE — CONSULTS
Inpatient consult to Infectious Diseases  Consult performed by: Js Salazar  Consult ordered by: Inga Blackburn, ALICIA-TOBI  Reason for consult: History of Latent TB        Primary MD: No primary care provider on file.    Reason For Consult  Recs for history of latent TB    History Of Present Illness  Vale Mendoza is a 34 y.o.  now  female with a PMHx of latent TB, large R ovarian cyst removed , and migraine presenting for induction of labor.     2016 Latent TB Dx by +PPD with negative CXR. Patient referred to [facility] TBB clinic but did not follow up.   18 Positive QuantiFERON-TB GOLD test.   2019 - Last CXR IMPRESSION: 1. No evidence of acute cardiopulmonary process.    Admitted  for IOL. Patient is afebrile. No record of TB treatments on file.     Component      Latest Ref Rng 2024   WBC      4.4 - 11.3 x10*3/uL 8.6  8.4    nRBC      0.0 - 0.0 /100 WBCs 0.0  0.0    RBC      4.00 - 5.20 x10*6/uL 3.88 (L)  3.88 (L)    HEMOGLOBIN      12.0 - 16.0 g/dL 11.6 (L)  12.0    HEMATOCRIT      36.0 - 46.0 % 35.7 (L)  35.5 (L)    MCV      80 - 100 fL 92  92    MCHC      32.0 - 36.0 g/dL 32.5  33.8    Platelets      150 - 450 x10*3/uL 250  229    RED CELL DISTRIBUTION WIDTH      11.5 - 14.5 % 14.1  14.6 (H)    MCH      26.0 - 34.0 pg 29.9  30.9      Spoke to the patient through a Russian . She reports that her mother and everyon in the house were treated for TB several years ago ([id number]). However, she found out she was pregnant with her second child shortly after initiating the treatment and stopped taking it. She never went back to a doctor to complete the course. She has not had an X-ray in several years (2019).     We discussed how there could still be bacteria living in her lungs and she agreed to take the antibiotics if she can get them here. She also said she could come back to [facility] for follow up blood work.     She denies fevers, chills, night sweats, nausea, vomiting,  cough, chest pain, SOB, jaundice, pruritus, rash, joint or muscle pain, and fatigue. She has gained weight appropriately during this pregnancy.     She endorses back pain from before she was pregnant and reports pain in her shoulders and epigastrum with deep breathing. Patient mostly noticed the pain in her epigastrium and thought it was due to her GERD.      Past Medical History  She has a past medical history of Abnormal Pap smear of cervix, AMA (advanced maternal age) multigravida 35+, Latent tuberculosis, and Migraine.  GERD    Surgical History  She has a past surgical history that includes  section, low transverse and Oophorectomy.     Social History     Occupational History    Not on file   Tobacco Use    Smoking status: Never    Smokeless tobacco: Current     Types: Chew   Vaping Use    Vaping Use: Never used   Substance and Sexual Activity    Alcohol use: Never    Drug use: Never    Sexual activity: Yes     Partners: Male     Birth control/protection: None     Travel History   Travel since 22    No documented travel since 24            Pets: unknown  Hobbies: unknown    Family History  Family History   Problem Relation Name Age of Onset    Tuberculosis Father      Brain cancer Sister       Allergies  Patient has no known allergies.     Immunization History   Administered Date(s) Administered    Flu vaccine (IIV4), preservative free *Check age/dose* 2024    Hepatitis B vaccine, adult (RECOMBIVAX, ENGERIX) 2015, 04/15/2015, 2015    Influenza, seasonal, injectable 2016    MMR vaccine, subcutaneous (MMR II) 2015, 04/15/2015    Td vaccine, age 7 years and older (TDVAX) 2015    Tdap vaccine, age 7 year and older (BOOSTRIX, ADACEL) 2015, 2016, 2024     Medications  Home medications:  Medications Prior to Admission   Medication Sig Dispense Refill Last Dose    alcohol swabs (Alcohol Prep Pads) pads, medicated Apply 1 Product topically 4 times  "a day. 100 each 3     blood sugar diagnostic (OneTouch Ultra Test) strip Use 1 strip 4-6 times per day during pregnancy 150 each 3     blood-glucose meter (OneTouch Verio Flex meter) misc Use for testing blood sugar 4-6 times per day during pregnancy 1 each 0     ferrous gluconate (Fergon) 240 (27 Fe) MG tablet Take 2 tablets (480 mg) by mouth every other day. One pill twice a day every other day 90 tablet 3 2/17/2024    ferrous sulfate, 325 mg ferrous sulfate, tablet Take 1 tablet by mouth once daily with a meal. 30 tablet 11     lancets misc Test blood sugar fasting in the morning, then 1hr after each meal (4 times per day) 100 each 3     lancets misc Use 1 lancet 4-6 times per day during pregnancy 200 each 3     lancets 33 gauge misc Use 1 lancet 4-6 times per day during pregnancy 200 each 3     prenatal vitamin, iron-folic, 27 mg iron-800 mcg folic acid tablet Take 1 tablet by mouth once daily. 90 tablet 3 2/17/2024     Current medications:  Scheduled medications  acetaminophen, 975 mg, oral, q6h  enoxaparin, 40 mg, subcutaneous, q24h  ibuprofen, 600 mg, oral, q6h      Continuous medications     PRN medications  PRN medications: benzocaine-menthoL, bisacodyl, carboprost, diphenhydrAMINE **OR** diphenhydrAMINE, hydrALAZINE, labetaloL, lanolin, lidocaine, loperamide, magnesium hydroxide, measles, mumps and rubella, methylergonovine, miSOPROStoL, NIFEdipine, ondansetron **OR** ondansetron, oxygen, oxytocin, oxytocin, oxytocin, oxytocin, oxytocin, polyethylene glycol, psyllium, simethicone, tranexamic acid, witch hazel    Review of Systems   All other systems reviewed and are negative.       Objective  Range of Vitals (last 24 hours)  Heart Rate:  []   Temp:  [36.3 °C (97.3 °F)-36.9 °C (98.4 °F)]   Resp:  [16-18]   BP: (110-163)/()   Height:  [147.3 cm (4' 10\")]   Weight:  [79 kg (174 lb 2.6 oz)]   SpO2:  [86 %-99 %]   Daily Weight  02/18/24 : 79 kg (174 lb 2.6 oz)    Body mass index is 36.4 kg/m².   " "  Physical Exam  Constitutional:       Appearance: Normal appearance. She is obese.   HENT:      Head: Normocephalic and atraumatic.      Right Ear: External ear normal.      Left Ear: External ear normal.      Nose: Nose normal.      Mouth/Throat:      Mouth: Mucous membranes are moist.      Pharynx: Oropharynx is clear.   Eyes:      General: No scleral icterus.     Conjunctiva/sclera: Conjunctivae normal.   Cardiovascular:      Rate and Rhythm: Normal rate and regular rhythm.      Pulses: Normal pulses.      Heart sounds: Normal heart sounds. No murmur heard.     No gallop.   Pulmonary:      Effort: Pulmonary effort is normal.      Breath sounds: Normal breath sounds.   Abdominal:      Comments: Soft and mildly distended. Tender due to recent delivery particularly in the lower quadrants. No hepatomegaly   Musculoskeletal:         General: No swelling or tenderness.   Skin:     General: Skin is warm and dry.   Neurological:      Mental Status: She is alert and oriented to person, place, and time.          Relevant Results  Outside Hospital Results  Yes - Per HPI  Labs  Results from last 72 hours   Lab Units 02/18/24  0948   WBC AUTO x10*3/uL 8.4   HEMOGLOBIN g/dL 12.0   HEMATOCRIT % 35.5*   PLATELETS AUTO x10*3/uL 229             CrCl cannot be calculated (Patient's most recent lab result is older than the maximum 3 days allowed.).  No results found for: \"CRP\", \"SEDRATE\"  HIV 1 and 2 Screen   Date Value Ref Range Status   09/29/2023 NONREACTIVE NONREACTIVE Final     Comment:      HIV Ag/Ab screen is performed using the Siemens AtellAccipiter Radar   HIV Ag/Ab Combo assay which detects the presence of HIV    p24 antigen as well as antibodies to HIV-1   (Group M and O) and HIV-2.  .  No laboratory evidence of HIV infection. If acute HIV infection is   suspected, consider testing for HIV RNA by PCR (viral load).       Hepatitis C Ab   Date Value Ref Range Status   09/29/2023 NONREACTIVE NONREACTIVE Final     Comment:      Results " from patients taking biotin supplements or receiving   high-dose biotin therapy should be interpreted with caution   due to possible interference with this test. Providers may    contact their local laboratory for further information.       Microbiology  No results found for the last 90 days.      Imaging         Assessment/Plan   Vale Mendoza is a 34 y.o.  female with a PMHx of latent TB, large R ovarian cyst removed 2018, and migraine presenting for induction of labor. She has a history of latent TB infection based on positive PPD in 2016 and positive quantiferon in 2018. Patient started a TB treatment regimen with her household in 2016 but did not finish it because she found she was pregnant, and last CXR was 5 years ago in 2019. She has no symptoms currently. Both rifampin and isoniazid are likely safe while breastfeeding, although infants should be monitored for jaundice with isoniazid treatment.     IMPRESSIONS:  Latent TB    Recommendations:   - PA and Leteral CXR inpatient   - Repeat Quantiferon   - LFTs, RFP, CRP/ESR   - Follow-up with Dr. Cobb outpatient in 3 months    ID will Sign Off. Thank you for the consult. Please reach out with any further questions.    Patient discussed with Dr. Cobb. Recommendations not finalized until attested.     Js Salazar, MS4

## 2024-02-20 PROBLEM — O13.9 GESTATIONAL HYPERTENSION, UNSPECIFIED TRIMESTER (HHS-HCC): Status: ACTIVE | Noted: 2024-02-20

## 2024-02-20 PROCEDURE — 1120000001 HC OB PRIVATE ROOM DAILY

## 2024-02-20 PROCEDURE — 2500000005 HC RX 250 GENERAL PHARMACY W/O HCPCS

## 2024-02-20 PROCEDURE — 2500000002 HC RX 250 W HCPCS SELF ADMINISTERED DRUGS (ALT 637 FOR MEDICARE OP, ALT 636 FOR OP/ED)

## 2024-02-20 PROCEDURE — 2500000001 HC RX 250 WO HCPCS SELF ADMINISTERED DRUGS (ALT 637 FOR MEDICARE OP)

## 2024-02-20 PROCEDURE — 2500000004 HC RX 250 GENERAL PHARMACY W/ HCPCS (ALT 636 FOR OP/ED)

## 2024-02-20 PROCEDURE — 1100000001 HC PRIVATE ROOM DAILY

## 2024-02-20 RX ORDER — LIDOCAINE HYDROCHLORIDE 10 MG/ML
5 INJECTION, SOLUTION EPIDURAL; INFILTRATION; INTRACAUDAL; PERINEURAL ONCE AS NEEDED
Status: DISCONTINUED | OUTPATIENT
Start: 2024-02-20 | End: 2024-02-21 | Stop reason: HOSPADM

## 2024-02-20 RX ORDER — METOCLOPRAMIDE HYDROCHLORIDE 5 MG/ML
10 INJECTION INTRAMUSCULAR; INTRAVENOUS EVERY 6 HOURS PRN
Status: DISCONTINUED | OUTPATIENT
Start: 2024-02-20 | End: 2024-02-21 | Stop reason: HOSPADM

## 2024-02-20 RX ORDER — METOCLOPRAMIDE 10 MG/1
10 TABLET ORAL EVERY 6 HOURS PRN
Status: DISCONTINUED | OUTPATIENT
Start: 2024-02-20 | End: 2024-02-21 | Stop reason: HOSPADM

## 2024-02-20 RX ORDER — NIFEDIPINE 30 MG/1
30 TABLET, FILM COATED, EXTENDED RELEASE ORAL
Status: DISCONTINUED | OUTPATIENT
Start: 2024-02-20 | End: 2024-02-21 | Stop reason: HOSPADM

## 2024-02-20 RX ADMIN — FAMOTIDINE 20 MG: 20 TABLET, FILM COATED ORAL at 21:54

## 2024-02-20 RX ADMIN — NIFEDIPINE 30 MG: 30 TABLET, FILM COATED, EXTENDED RELEASE ORAL at 11:15

## 2024-02-20 RX ADMIN — ACETAMINOPHEN 975 MG: 325 TABLET ORAL at 17:04

## 2024-02-20 RX ADMIN — ACETAMINOPHEN 975 MG: 325 TABLET ORAL at 11:14

## 2024-02-20 RX ADMIN — DIPHENHYDRAMINE HYDROCHLORIDE AND LIDOCAINE HYDROCHLORIDE AND ALUMINUM HYDROXIDE AND MAGNESIUM HYDRO 10 ML: KIT at 05:02

## 2024-02-20 RX ADMIN — POLYETHYLENE GLYCOL 3350 17 G: 17 POWDER, FOR SOLUTION ORAL at 15:13

## 2024-02-20 RX ADMIN — METOCLOPRAMIDE 10 MG: 10 TABLET ORAL at 15:13

## 2024-02-20 RX ADMIN — DIPHENHYDRAMINE HYDROCHLORIDE AND LIDOCAINE HYDROCHLORIDE AND ALUMINUM HYDROXIDE AND MAGNESIUM HYDRO 10 ML: KIT at 11:15

## 2024-02-20 RX ADMIN — ACETAMINOPHEN 975 MG: 325 TABLET ORAL at 05:02

## 2024-02-20 RX ADMIN — FAMOTIDINE 20 MG: 20 TABLET, FILM COATED ORAL at 08:02

## 2024-02-20 RX ADMIN — ENOXAPARIN SODIUM 40 MG: 100 INJECTION SUBCUTANEOUS at 17:04

## 2024-02-20 RX ADMIN — CALCIUM CARBONATE (ANTACID) CHEW TAB 500 MG 500 MG: 500 CHEW TAB at 08:01

## 2024-02-20 RX ADMIN — DIPHENHYDRAMINE HYDROCHLORIDE AND LIDOCAINE HYDROCHLORIDE AND ALUMINUM HYDROXIDE AND MAGNESIUM HYDRO 10 ML: KIT at 17:04

## 2024-02-20 ASSESSMENT — PAIN SCALES - GENERAL
PAINLEVEL_OUTOF10: 1
PAINLEVEL_OUTOF10: 4
PAINLEVEL_OUTOF10: 2

## 2024-02-20 ASSESSMENT — PAIN DESCRIPTION - DESCRIPTORS
DESCRIPTORS: SORE
DESCRIPTORS: SORE

## 2024-02-20 NOTE — SIGNIFICANT EVENT
BP Cuff and Home Monitoring  Patient meets criteria for home monitoring of blood pressure post discharge.  Met with patient to assess for availability of home BP monitor.  Patient does not have access to BP monitor at home and declined obtaining BP monitor from [facility]. Prescription provided for BP monitor and patient verbalized responsibility for obtaining her own BP monitor after discharge.  Patient educated on importance of continuing to monitor BP at home, recording BP on home monitoring log and s/sx of when to call her provider.  Pt verbalized understanding the above information.    RX to be sent to pharmacy by Jessica Salguero    All education was done using [employer].

## 2024-02-20 NOTE — PROGRESS NOTES
Postpartum Progress Note    Assessment/Plan   Vale Mendoza is a 34 y.o., , who delivered at 39w2d gestation and is now postpartum day 1.    Now PPD#1 s/p  on   - continue routine postpartum care  - pain well controlled on po medications  - DVT Score: 5, for ppx lovenox  - Hgb:   Results from last 7 days   Lab Units 24  0948   HEMOGLOBIN g/dL 12.0     gHTN  - Dx by 2 mild range BPs > 4 hrs apart postpartum  - Asymptomatic  - Nifedipine 30 mg for persistent mild range BP   - BP cuff for home  - Discussed recommendation for 3 day stay, patient agreeable     GDMA1  - 2 hr OGTT at PPV    Latent TB  - ID consulted  - Recommended repeat labs (LFTs, RFP, CRP/ESR and quantiferon) and chest x-ray  - Chest x-ray negative  - TB spot pending, follow up results  - CRP elevated (7.24) in s/o postpatum  - Follow up with Dr. Cobb (ID) in 3 months,  order placed    Recent Gastritis  - Famotidine BID  - Maalox and Tums PRN  - Holding ibuprofen    Maternal Well-Being  - emotional support provided  - bonding with infant  - famotidine BID for reflux    [place] Feeding  - breastfeeding/pumping encouraged; lactation consult prn    Contraception  - Nexplanon prior to discharge  - Needs consented, orders placed    Dispo  - Anticipate DC PPD3 pending BP control.  - The signs and symptoms of PEC were reviewed with the patient, including unrelenting headache, vision changes/blurred vision, and pain underneath the right breast.   - BP cuff for home for checking BP BID. Pt instructed to call primary OB if SBP > 160 or DBP > 110.  - On discharge, follow up with primary OB in 2-5 days for BP check, 2 weeks for incision check, and 4-6 weeks for postpartum visit.      Principal Problem:    Labor and delivery indication for care or intervention  Active Problems:    Latent tuberculosis    Gestational diabetes mellitus (GDM) in third trimester    Gastroesophageal reflux disease    Gestational hypertension, unspecified  trimester    Pregnancy Problems (from 23 to present)       Problem Noted Resolved    Gestational hypertension, unspecified trimester 2024 by LÓPEZ Larsen No    Priority:  Medium      Labor and delivery indication for care or intervention 2024 by Loyda Fonseca MD No    Priority:  Medium      Gestational diabetes mellitus (GDM) in third trimester 2024 by Randy Simon MD No    Priority:  Medium      Overview Signed 2024  4:03 PM by Randy Simon MD     -Diabetes RN education   -BG supplies given, RTC 1 week for BG review and delivery planning         Hx of  section 2024 by BLAISE Neville No    Priority:  Medium      Overview Addendum 2024  4:43 PM by BLAISE Neville     2016 LTCS for breech  Unsure if she would like to  or not          History of postpartum hemorrhage 2024 by BLAISE Neville No    Priority:  Medium      Overview Signed 2024  4:44 PM by BLAISE Neville     With 2016 pLTCS  S/p blood transfusion   Type and cross on admission          Multigravida of advanced maternal age in third trimester 2024 by BLAISE Neville No    Priority:  Medium      Overview Signed 2024  4:07 PM by BLAISE Neville     Not seen by genetics         Anemia during pregnancy in second trimester 2023 by BLAISE Bah No    Priority:  Medium      Overview Addendum 2024  4:03 PM by BLAISE Neville     Lab Results   Component Value Date    WBC 6.9 2023    HGB 9.9 (L) 2023    HCT 30.4 (L) 2023    MCV 96 2023     2023   Folate 4  Ferritin 20  Patient unsure if she's taking that at 32w; didn't bring medications with her; rx resent   Recheck CBC next visit           Supervision of other normal pregnancy, antepartum 10/23/2023 by Jessica Trevino No    Priority:  Medium    "   Overview Addendum 1/5/2024  4:06 PM by BLAISE Neville     Desired provider in labor: [] CNM  [] Physician  [x] Dated by:  22w US not c/w LMP   [] Initial BMI:   [x] Prenatal Labs: WNL  [x] Rh status: A+  [] Genetic Screening:    [x] Baby ASA: not on    [x] Anatomy US: WNl  [x] Fetal Sex: male, declines circ  [] Patient added to BirthTracks  [x] 1hr GCT at 24-28wks: 11/27/23 139  [] 3 hr GTT (if indicated):  [] Fetal Surveillance (if indicated):    [x] Tdap (27-36wks):  [x] Flu Shot: 1/5.24  [] COVID vaccine:     [] Breastfeeding:  [] Pain management during labor:   [] Postpartum Birth control method:   [] Labor Support:   [] GBS at 36 wks:               Hospital course: gestational diabetes  gestational hypertension  Vaginal Birth  Patient is currently breastfeedingThe patient's blood type is A POS. The baby's blood type is pending . Rhogam is not indicated.    Subjective   Her pain is well controlled with current medications  She is passing flatus  She is ambulating well  She is tolerating a Adult diet Regular  She reports no breast or nursing problems  She denies emotional concerns today   Her plan for contraception is Nexplanon    Macedonian  via oBaz used during visit:   Patient reports eating a traditional [place] soup and is concerned it caused her to have high blood pressure.   Denies HA, SOB, RUQ pain, vision changes, reports head feels \"heavy.\"  Reports reflux, on famotidine    Objective   Allergies:   Patient has no known allergies.         Last Vitals:  Temp Pulse Resp BP MAP Pulse Ox   36.7 °C (98.1 °F) 94 16 119/85   97 %     Vitals Min/Max Last 24 Hours:  Temp  Min: 36.2 °C (97.2 °F)  Max: 36.7 °C (98.1 °F)  Pulse  Min: 70  Max: 94  Resp  Min: 16  Max: 18  BP  Min: 119/85  Max: 151/96    Intake/Output:   No intake or output data in the 24 hours ending 02/20/24 1256    Physical Exam:  General: Examination reveals a well developed, well nourished, female, in no acute " distress. She is alert and cooperative.  Lungs: symmetrical, non-labored breathing.  Cardiac: warm, well-perfused.  Abdomen: soft, non-tender.  Fundus: firm, below umbilicus, and nontender.  Extremities: no redness or tenderness in the calves or thighs.  Neurological: alert, oriented, normal speech, no focal findings or movement disorder noted.     Lab Data:  Labs in chart were reviewed.

## 2024-02-20 NOTE — PROGRESS NOTES
Social Work Note    Patient: Vale Mendoza, 35yo,     LEIDA met with Ms Mendoza with assistance of MARTII  and assisted Ms Mendoza to arrange [employer] transportation for  appt on Saturday. SW also provided [employer] transportation number [phone] and instructions on how to access a [facility]  via that number. Ms Mendoza denied additional questions or needs. SW available as needed, please message.    TAYLOR Roberto

## 2024-02-20 NOTE — ANESTHESIA POSTPROCEDURE EVALUATION
Patient: Vale Mendoza    Procedure Summary       Date: 24 Room / Location:     Anesthesia Start:  Anesthesia Stop: 24    Procedure: Labor Analgesia Diagnosis:     Scheduled Providers:  Responsible Provider: Tung Sung MD    Anesthesia Type: epidural ASA Status: 3            Anesthesia Type: epidural      Anesthesia Post Evaluation    Patient location during evaluation: floor  Patient participation: complete - patient participated  Level of consciousness: awake and alert  Pain management: adequate  Airway patency: patent  Cardiovascular status: acceptable and hemodynamically stable  Respiratory status: acceptable and room air  Hydration status: acceptable  Postoperative Nausea and Vomiting: none  Comments: Vale Mendoza is a 34 y.o., , who had a Vaginal, Spontaneous delivery on 2024 at 39w2d and is now POD1.    She had Neuraxial Anesthesia without immediate complications noted.       Pain well controlled    ---------------------------               24                      0400         ---------------------------   BP:        (!) 144/89       Pulse:         81           Resp:          18           Temp:   36.5 °C (97.7 °F)   SpO2:          95%         ---------------------------    Neuraxial site assessed. No visible redness or swelling or drainage. Patient able to ambulate and move all extremities without difficulty. Able to void. No complaints of nausea/vomiting. Tolerating PO intake well. No s/sx of PDPH. She is c/o mild discomfort from her known gastritis.      Anesthesia will sign off     VICKY Ford          No notable events documented.

## 2024-02-21 VITALS
SYSTOLIC BLOOD PRESSURE: 135 MMHG | HEIGHT: 58 IN | OXYGEN SATURATION: 97 % | HEART RATE: 77 BPM | WEIGHT: 174.16 LBS | BODY MASS INDEX: 36.56 KG/M2 | DIASTOLIC BLOOD PRESSURE: 87 MMHG | TEMPERATURE: 97.7 F | RESPIRATION RATE: 16 BRPM

## 2024-02-21 PROBLEM — O24.419 GESTATIONAL DIABETES MELLITUS (GDM) IN THIRD TRIMESTER (HHS-HCC): Status: RESOLVED | Noted: 2024-01-30 | Resolved: 2024-02-21

## 2024-02-21 LAB
BLOOD EXPIRATION DATE: NORMAL
DISPENSE STATUS: NORMAL
NIL(NEG) CONTROL SPOT COUNT: NORMAL
PANEL A SPOT COUNT: 2
PANEL B SPOT COUNT: 4
POS CONTROL SPOT COUNT: NORMAL
PRODUCT BLOOD TYPE: NORMAL
PRODUCT CODE: NORMAL
T-SPOT. TB INTERPRETATION: NEGATIVE
UNIT ABO: NORMAL
UNIT NUMBER: NORMAL
UNIT RH: NORMAL
UNIT VOLUME: 350
XM INTEP: NORMAL

## 2024-02-21 PROCEDURE — 2500000002 HC RX 250 W HCPCS SELF ADMINISTERED DRUGS (ALT 637 FOR MEDICARE OP, ALT 636 FOR OP/ED)

## 2024-02-21 PROCEDURE — 0JHD3HZ INSERTION OF CONTRACEPTIVE DEVICE INTO RIGHT UPPER ARM SUBCUTANEOUS TISSUE AND FASCIA, PERCUTANEOUS APPROACH: ICD-10-PCS | Performed by: NURSE PRACTITIONER

## 2024-02-21 PROCEDURE — 2500000004 HC RX 250 GENERAL PHARMACY W/ HCPCS (ALT 636 FOR OP/ED)

## 2024-02-21 PROCEDURE — 11981 INSERTION DRUG DLVR IMPLANT: CPT | Performed by: NURSE PRACTITIONER

## 2024-02-21 PROCEDURE — 99231 SBSQ HOSP IP/OBS SF/LOW 25: CPT | Performed by: NURSE PRACTITIONER

## 2024-02-21 PROCEDURE — 2500000001 HC RX 250 WO HCPCS SELF ADMINISTERED DRUGS (ALT 637 FOR MEDICARE OP)

## 2024-02-21 RX ORDER — LIDOCAINE HYDROCHLORIDE 10 MG/ML
INJECTION INFILTRATION; PERINEURAL
Status: DISCONTINUED
Start: 2024-02-21 | End: 2024-02-21 | Stop reason: HOSPADM

## 2024-02-21 RX ORDER — POLYETHYLENE GLYCOL 3350 17 G/17G
17 POWDER, FOR SOLUTION ORAL 2 TIMES DAILY PRN
Qty: 14 PACKET | Refills: 0 | Status: SHIPPED | OUTPATIENT
Start: 2024-02-21

## 2024-02-21 RX ORDER — ACETAMINOPHEN 325 MG/1
975 TABLET ORAL EVERY 6 HOURS PRN
Qty: 120 TABLET | Refills: 0 | Status: SHIPPED | OUTPATIENT
Start: 2024-02-21 | End: 2024-02-22 | Stop reason: SDUPTHER

## 2024-02-21 RX ORDER — NIFEDIPINE 30 MG/1
30 TABLET, FILM COATED, EXTENDED RELEASE ORAL
Qty: 42 TABLET | Refills: 0 | Status: SHIPPED | OUTPATIENT
Start: 2024-02-21

## 2024-02-21 RX ORDER — FAMOTIDINE 20 MG/1
20 TABLET, FILM COATED ORAL 2 TIMES DAILY
Qty: 60 TABLET | Refills: 1 | Status: SHIPPED | OUTPATIENT
Start: 2024-02-21

## 2024-02-21 RX ADMIN — ACETAMINOPHEN 975 MG: 325 TABLET ORAL at 01:07

## 2024-02-21 RX ADMIN — NIFEDIPINE 30 MG: 30 TABLET, FILM COATED, EXTENDED RELEASE ORAL at 08:16

## 2024-02-21 RX ADMIN — ACETAMINOPHEN 975 MG: 325 TABLET ORAL at 06:50

## 2024-02-21 RX ADMIN — FAMOTIDINE 20 MG: 20 TABLET, FILM COATED ORAL at 08:16

## 2024-02-21 RX ADMIN — ETONOGESTREL 1 EACH: 68 IMPLANT SUBCUTANEOUS at 15:40

## 2024-02-21 RX ADMIN — CALCIUM CARBONATE (ANTACID) CHEW TAB 500 MG 500 MG: 500 CHEW TAB at 08:16

## 2024-02-21 ASSESSMENT — PAIN SCALES - GENERAL
PAINLEVEL_OUTOF10: 3
PAINLEVEL_OUTOF10: 2
PAINLEVEL_OUTOF10: 2

## 2024-02-21 NOTE — PROCEDURES
General    Date/Time: 2/21/2024 4:30 PM    Performed by: LÓPEZ Hoang  Authorized by: LÓPEZ Hoang    Consent:     Consent obtained:  Verbal and written    Consent given by:  Patient (and witness via )    Risks, benefits, and alternatives were discussed: yes      Risks discussed:  Bleeding, infection and pain    Alternatives discussed:  No treatment, delayed treatment and alternative treatment  Universal protocol:     Procedure explained and questions answered to patient or proxy's satisfaction: yes      Relevant documents present and verified: yes      Required blood products, implants, devices, and special equipment available: yes      Site/side marked: yes      Immediately prior to procedure, a time out was called: yes      Patient identity confirmed:  Verbally with patient  Indications:     Indications:  Request for subdermal LARC  Pre-procedure details:     Skin preparation:  Povidone-iodine  Sedation:     Sedation type:  None  Anesthesia:     Anesthesia method:  Local infiltration    Local anesthetic:  Lidocaine 1% w/o epi  Procedure specific details:      Nexplanon placement      Risks, benefits and alternatives were discussed with the patient. We discussed possible complications and risks, including infection, bleeding, pain, tingling, failure, migration of implant, increased risk of ectopic should pregnancy occur and inability to remove implant. Electronic consent was obtained prior to the procedure and is detailed in the patient's record.     Procedure Note: Patient placed in semi-fowlers position. 3 ml of 1% lidocaine was injected just under the skin at marked insertion site.  The skin was then prepped with betadine.   Using standard technique, the implant was inserted in the inner side of the patient's RIGHT upper arm. Insertion was confirmed by visual inspection of the tip of the needle and palpation of the patient's arm. No blood loss.  Post insertion  precautions/expectations were discussed with the patient.  LÓPEZ Hoang     Post-procedure details:     Procedure completion:  Tolerated well, no immediate complications

## 2024-02-21 NOTE — DISCHARGE SUMMARY
Discharge Summary    Admission Date: 2024  Discharge Date: 24  Discharge Diagnosis: Labor and delivery indication for care or intervention     Patient Active Problem List   Diagnosis    Common migraine without aura    Dizziness    Supervision of other normal pregnancy, antepartum    Latent tuberculosis    Ovarian cyst in pregnancy in second trimester    Refraction error    Tension headache    Anemia during pregnancy in second trimester    Glucose tolerance test abnormal    Multigravida of advanced maternal age in third trimester    Hx of  section    History of postpartum hemorrhage    Labor and delivery indication for care or intervention    Gastroesophageal reflux disease    Gestational hypertension, antepartum       Hospital Course  Vale Mendoza is a 34 y.o.,     Initially presented for: rrIOL    Admission Date: 2024    Delivery Date: 2024  3:38 AM     Delivery type: Vaginal, Spontaneous      GA at delivery: 39w2d    Outcome: Living     Anesthesia during delivery: Epidural     Intrapartum complications: None     Feeding method: Breastfeeding Status: Yes    Contraception: Nexplanon    Rhogam: The patient's blood type is A POS.  Rhogam is not indicated.     Now postpartum day: 2.    Hospital course n/f:    gHTN  - Dx by 2 mild range BPs > 4 hrs apart postpartum  - Asymptomatic  - Nifedipine 30 mg started ; BP's high normotensive  - BP cuff for home  - Discussed recommendation for 3 day stay, pt strongly desires DC today      Latent TB  - ID consulted  - Recommended repeat labs (LFTs, RFP, CRP/ESR and quantiferon) and chest x-ray  - Chest x-ray negative  - TB spot pending, follow up results  - CRP elevated (7.24) in s/o postpatum  - Follow up with Dr. Cobb (ID) in 3 months,  order placed    I have reviewed with the patient the standard 3 day stay for hypertensive disorders and the risks/benefits of early discharge, including death, stroke, seizure, and readmission. I  recommended an additional day of inpatient monitoring but agree it is reasonable for pt to DC today, monitor BID at home, and follow up tomorrow for BP check in clinic. Pt declines further monitoring at this time and requests early discharge today. No severe range BPs in 24hrs. OK for DC today and close follow up.     PP course otherwise uneventful.  Meeting all postpartum milestones- ambulating independently, passing flatus, tolerating PO intake, lochia light, voiding spontaneously, and pain well controlled with PO meds.     Dispo  OK for DC today using shared decision making via MARTTI  - The signs and symptoms of PEC were reviewed with the patient, including unrelenting headache, vision changes/blurred vision, and RUQ pain  - BP cuff for home for checking BP twice a day  - Pt instructed to call primary OB if SBP > 160 or DBP > 110 or if development of PEC symptoms   - On discharge, follow up with primary OB in:       - 2-5 days for BP check       - 4-6 week for post-partum visit     Pertinent Physical Exam At Time of Discharge  General: well appearing, well nourished, postpartum  Obstetric: fundus firm below umbilicus, lochia light  Skin: Warm, dry; no rashes/lesions/erythema  Breast: No masses, nipple discharge  Neuro: A/Ox3, conversational, no gross motor deficit   GI: no distension, appropriately tender, soft, +BS  Respiratory: Even and unlabored on RA, LSCTA BL  Cardiovascular: Trace BLE edema; No erythema, warmth  Psych: appropriate mood and affect       Your medication list        START taking these medications        Instructions Last Dose Given Next Dose Due   acetaminophen 325 mg tablet  Commonly known as: Tylenol      Take 3 tablets (975 mg) by mouth every 6 hours if needed for moderate pain (4 - 6).       famotidine 20 mg tablet  Commonly known as: Pepcid      Take 1 tablet (20 mg) by mouth 2 times a day.       miscellNetseer medical supply kit      1 each 2 times a day. 1 BP cuff to take BP two times  daily until 6 weeks postpartum       NIFEdipine ER 30 mg 24 hr tablet  Commonly known as: Adalat CC      Take 1 tablet (30 mg) by mouth once daily with breakfast. Do not crush, chew, or split.       polyethylene glycol 17 gram packet  Commonly known as: Glycolax, Miralax      Take 17 g by mouth 2 times a day as needed (constipation).              CONTINUE taking these medications        Instructions Last Dose Given Next Dose Due   prenatal vitamin (iron-folic) 27 mg iron-800 mcg folic acid tablet      Take 1 tablet by mouth once daily.              STOP taking these medications      alcohol swabs pads, medicated  Commonly known as: Alcohol Prep Pads        ferrous gluconate 240 (27 Fe) MG tablet  Commonly known as: Fergon        ferrous sulfate (325 mg ferrous sulfate) tablet        lancets 33 gauge misc        lancets misc        OneTouch Verio Flex meter misc  Generic drug: blood-glucose meter        OneTouch Verio test strips strip  Generic drug: blood sugar diagnostic                  Where to Get Your Medications        These medications were sent to [id number] - [place] [address] [address] [facility] [place] [address] [facility] [facility] [address]      Phone: [phone]   acetaminophen 325 mg tablet  famotidine 20 mg tablet  miscellaneous medical supply kit  NIFEdipine ER 30 mg 24 hr tablet  polyethylene glycol 17 gram packet           Outpatient Follow-Up  Future Appointments   Date Time Provider Department Center   5/9/2024 11:00 AM Jose Ramon Cobb MD [id number]       ALICIA Hoang-CNP

## 2024-02-22 ENCOUNTER — CLINICAL SUPPORT (OUTPATIENT)
Dept: OBSTETRICS AND GYNECOLOGY | Facility: HOSPITAL | Age: 34
End: 2024-02-22
Payer: COMMERCIAL

## 2024-02-22 VITALS — SYSTOLIC BLOOD PRESSURE: 119 MMHG | DIASTOLIC BLOOD PRESSURE: 80 MMHG | WEIGHT: 159 LBS | BODY MASS INDEX: 33.23 KG/M2

## 2024-02-22 DIAGNOSIS — Z01.30 BLOOD PRESSURE CHECK: ICD-10-CM

## 2024-02-22 LAB
LABORATORY COMMENT REPORT: NORMAL
PATH REPORT.FINAL DX SPEC: NORMAL
PATH REPORT.GROSS SPEC: NORMAL
PATH REPORT.RELEVANT HX SPEC: NORMAL
PATH REPORT.TOTAL CANCER: NORMAL

## 2024-02-22 PROCEDURE — 99211 OFF/OP EST MAY X REQ PHY/QHP: CPT | Performed by: ADVANCED PRACTICE MIDWIFE

## 2024-02-22 RX ORDER — ACETAMINOPHEN 325 MG/1
650 TABLET ORAL EVERY 6 HOURS PRN
Qty: 120 TABLET | Refills: 0 | Status: SHIPPED | OUTPATIENT
Start: 2024-02-22

## 2024-02-22 ASSESSMENT — PAIN SCALES - GENERAL: PAINLEVEL: 6

## 2024-02-22 ASSESSMENT — ENCOUNTER SYMPTOMS
LOSS OF SENSATION IN FEET: 0
OCCASIONAL FEELINGS OF UNSTEADINESS: 0
DEPRESSION: 0

## 2024-02-22 NOTE — PROGRESS NOTES
Patient identified by name and   Patient had a vaginal delivery on 24  Pt denies any headache, lightheadedness, dizziness, blurred vision, or pain in the upper right quadrant.   Patient continues to take medication as prescribed  Patient denies any symptoms of depression, SI or HI  Patient appears well and overall in a good condition  Patient encouraged to continue to take  her medications as ordered  Patient also informed that she should schedule her PPV  Patient verbalized understanding   Carito BURT